# Patient Record
Sex: MALE | Race: BLACK OR AFRICAN AMERICAN | NOT HISPANIC OR LATINO | Employment: FULL TIME | URBAN - METROPOLITAN AREA
[De-identification: names, ages, dates, MRNs, and addresses within clinical notes are randomized per-mention and may not be internally consistent; named-entity substitution may affect disease eponyms.]

---

## 2023-09-20 ENCOUNTER — HOSPITAL ENCOUNTER (OUTPATIENT)
Dept: DATA CONVERSION | Facility: HOSPITAL | Age: 53
End: 2023-09-20

## 2023-09-20 DIAGNOSIS — M17.11 UNILATERAL PRIMARY OSTEOARTHRITIS, RIGHT KNEE: ICD-10-CM

## 2023-10-10 ENCOUNTER — APPOINTMENT (OUTPATIENT)
Dept: PREADMISSION TESTING | Facility: HOSPITAL | Age: 53
End: 2023-10-10
Payer: COMMERCIAL

## 2023-10-16 ENCOUNTER — PRE-ADMISSION TESTING (OUTPATIENT)
Dept: PREADMISSION TESTING | Facility: HOSPITAL | Age: 53
End: 2023-10-16
Payer: COMMERCIAL

## 2023-10-16 ENCOUNTER — ANESTHESIA EVENT (OUTPATIENT)
Dept: OPERATING ROOM | Facility: HOSPITAL | Age: 53
End: 2023-10-16
Payer: COMMERCIAL

## 2023-10-16 ENCOUNTER — LAB (OUTPATIENT)
Dept: LAB | Facility: LAB | Age: 53
End: 2023-10-16
Payer: COMMERCIAL

## 2023-10-16 VITALS
TEMPERATURE: 97.1 F | OXYGEN SATURATION: 99 % | WEIGHT: 223.77 LBS | BODY MASS INDEX: 28.72 KG/M2 | DIASTOLIC BLOOD PRESSURE: 78 MMHG | HEIGHT: 74 IN | HEART RATE: 63 BPM | RESPIRATION RATE: 18 BRPM | SYSTOLIC BLOOD PRESSURE: 132 MMHG

## 2023-10-16 DIAGNOSIS — Z01.818 PREOPERATIVE TESTING: Primary | ICD-10-CM

## 2023-10-16 DIAGNOSIS — Z01.818 PREOPERATIVE TESTING: ICD-10-CM

## 2023-10-16 LAB
ALBUMIN SERPL BCP-MCNC: 4.5 G/DL (ref 3.4–5)
ALP SERPL-CCNC: 54 U/L (ref 33–120)
ALT SERPL W P-5'-P-CCNC: 12 U/L (ref 10–52)
ANION GAP SERPL CALC-SCNC: 10 MMOL/L (ref 10–20)
APPEARANCE UR: CLEAR
AST SERPL W P-5'-P-CCNC: 14 U/L (ref 9–39)
BILIRUB SERPL-MCNC: 0.7 MG/DL (ref 0–1.2)
BILIRUB UR STRIP.AUTO-MCNC: NEGATIVE MG/DL
BUN SERPL-MCNC: 19 MG/DL (ref 6–23)
CALCIUM SERPL-MCNC: 9.5 MG/DL (ref 8.6–10.3)
CHLORIDE SERPL-SCNC: 102 MMOL/L (ref 98–107)
CO2 SERPL-SCNC: 27 MMOL/L (ref 21–32)
COLOR UR: YELLOW
CREAT SERPL-MCNC: 1.17 MG/DL (ref 0.5–1.3)
ERYTHROCYTE [DISTWIDTH] IN BLOOD BY AUTOMATED COUNT: 12 % (ref 11.5–14.5)
GFR SERPL CREATININE-BSD FRML MDRD: 74 ML/MIN/1.73M*2
GLUCOSE SERPL-MCNC: 93 MG/DL (ref 74–99)
GLUCOSE UR STRIP.AUTO-MCNC: NEGATIVE MG/DL
HCT VFR BLD AUTO: 38.4 % (ref 41–52)
HGB BLD-MCNC: 12.6 G/DL (ref 13.5–17.5)
HOLD SPECIMEN: NORMAL
KETONES UR STRIP.AUTO-MCNC: NEGATIVE MG/DL
LEUKOCYTE ESTERASE UR QL STRIP.AUTO: NEGATIVE
MCH RBC QN AUTO: 31.5 PG (ref 26–34)
MCHC RBC AUTO-ENTMCNC: 32.8 G/DL (ref 32–36)
MCV RBC AUTO: 96 FL (ref 80–100)
NITRITE UR QL STRIP.AUTO: NEGATIVE
NRBC BLD-RTO: ABNORMAL /100{WBCS}
PH UR STRIP.AUTO: 5.5 [PH]
PLATELET # BLD AUTO: 191 X10*3/UL (ref 150–450)
PMV BLD AUTO: 9.9 FL (ref 7.5–11.5)
POTASSIUM SERPL-SCNC: 4.2 MMOL/L (ref 3.5–5.3)
PROT SERPL-MCNC: 7.3 G/DL (ref 6.4–8.2)
PROT UR STRIP.AUTO-MCNC: NEGATIVE MG/DL
RBC # BLD AUTO: 4 X10*6/UL (ref 4.5–5.9)
RBC # UR STRIP.AUTO: NEGATIVE /UL
SODIUM SERPL-SCNC: 135 MMOL/L (ref 136–145)
SP GR UR STRIP.AUTO: <=1.005
UROBILINOGEN UR STRIP.AUTO-MCNC: 0.2 MG/DL
WBC # BLD AUTO: 3.9 X10*3/UL (ref 4.4–11.3)

## 2023-10-16 PROCEDURE — 80053 COMPREHEN METABOLIC PANEL: CPT

## 2023-10-16 PROCEDURE — 36415 COLL VENOUS BLD VENIPUNCTURE: CPT

## 2023-10-16 PROCEDURE — 85027 COMPLETE CBC AUTOMATED: CPT

## 2023-10-16 PROCEDURE — 81003 URINALYSIS AUTO W/O SCOPE: CPT

## 2023-10-16 ASSESSMENT — PAIN - FUNCTIONAL ASSESSMENT: PAIN_FUNCTIONAL_ASSESSMENT: 0-10

## 2023-10-16 ASSESSMENT — PAIN SCALES - GENERAL: PAINLEVEL_OUTOF10: 0 - NO PAIN

## 2023-10-16 NOTE — PREPROCEDURE INSTRUCTIONS
Medication List      as of October 16, 2023  9:09 AM     You have not been prescribed any medications.                         NPO Instructions:    Do not eat any food after midnight the night before your surgery/procedure.    Additional Instructions:     Seven/Six Days before Surgery:  Review your medication instructions, stop indicated medications  Five Days before Surgery:  Review your medication instructions, stop indicated medications  Three Days before Surgery:  Review your medication instructions, stop indicated medications  The Day before Surgery:  Review your medication instructions, stop indicated medications  You will be contacted regarding the time of your arrival to facility and surgery time  Do not eat any food after Midnight  Day of Surgery:  Review your medication instructions, take indicated medications  Wear  comfortable loose fitting clothing  Do not use moisturizers, creams, lotions or perfume  All jewelry and valuables should be left at home

## 2023-10-17 ENCOUNTER — HOSPITAL ENCOUNTER (OUTPATIENT)
Facility: HOSPITAL | Age: 53
Setting detail: OUTPATIENT SURGERY
Discharge: HOME | End: 2023-10-17
Attending: STUDENT IN AN ORGANIZED HEALTH CARE EDUCATION/TRAINING PROGRAM | Admitting: STUDENT IN AN ORGANIZED HEALTH CARE EDUCATION/TRAINING PROGRAM
Payer: COMMERCIAL

## 2023-10-17 ENCOUNTER — ANESTHESIA (OUTPATIENT)
Dept: OPERATING ROOM | Facility: HOSPITAL | Age: 53
End: 2023-10-17
Payer: COMMERCIAL

## 2023-10-17 VITALS
BODY MASS INDEX: 28.41 KG/M2 | RESPIRATION RATE: 20 BRPM | HEIGHT: 74 IN | WEIGHT: 221.34 LBS | DIASTOLIC BLOOD PRESSURE: 90 MMHG | HEART RATE: 56 BPM | TEMPERATURE: 96.8 F | OXYGEN SATURATION: 100 % | SYSTOLIC BLOOD PRESSURE: 123 MMHG

## 2023-10-17 DIAGNOSIS — R97.20 ELEVATED PSA: ICD-10-CM

## 2023-10-17 PROCEDURE — 2580000001 HC RX 258 IV SOLUTIONS: Performed by: ANESTHESIOLOGY

## 2023-10-17 PROCEDURE — 2720000007 HC OR 272 NO HCPCS: Performed by: STUDENT IN AN ORGANIZED HEALTH CARE EDUCATION/TRAINING PROGRAM

## 2023-10-17 PROCEDURE — 2500000005 HC RX 250 GENERAL PHARMACY W/O HCPCS

## 2023-10-17 PROCEDURE — 7100000001 HC RECOVERY ROOM TIME - INITIAL BASE CHARGE: Performed by: STUDENT IN AN ORGANIZED HEALTH CARE EDUCATION/TRAINING PROGRAM

## 2023-10-17 PROCEDURE — 3700000002 HC GENERAL ANESTHESIA TIME - EACH INCREMENTAL 1 MINUTE: Performed by: STUDENT IN AN ORGANIZED HEALTH CARE EDUCATION/TRAINING PROGRAM

## 2023-10-17 PROCEDURE — 88344 IMHCHEM/IMCYTCHM EA MLT ANTB: CPT | Performed by: STUDENT IN AN ORGANIZED HEALTH CARE EDUCATION/TRAINING PROGRAM

## 2023-10-17 PROCEDURE — 3600000007 HC OR TIME - EACH INCREMENTAL 1 MINUTE - PROCEDURE LEVEL TWO: Performed by: STUDENT IN AN ORGANIZED HEALTH CARE EDUCATION/TRAINING PROGRAM

## 2023-10-17 PROCEDURE — 2500000004 HC RX 250 GENERAL PHARMACY W/ HCPCS (ALT 636 FOR OP/ED): Performed by: STUDENT IN AN ORGANIZED HEALTH CARE EDUCATION/TRAINING PROGRAM

## 2023-10-17 PROCEDURE — 88305 TISSUE EXAM BY PATHOLOGIST: CPT | Mod: TC,SUR | Performed by: STUDENT IN AN ORGANIZED HEALTH CARE EDUCATION/TRAINING PROGRAM

## 2023-10-17 PROCEDURE — 55700 PR PROSTATE NEEDLE BIOPSY ANY APPROACH: CPT | Performed by: STUDENT IN AN ORGANIZED HEALTH CARE EDUCATION/TRAINING PROGRAM

## 2023-10-17 PROCEDURE — 2500000004 HC RX 250 GENERAL PHARMACY W/ HCPCS (ALT 636 FOR OP/ED): Performed by: NURSE ANESTHETIST, CERTIFIED REGISTERED

## 2023-10-17 PROCEDURE — A55700 PR BIOPSY OF PROSTATE,NEEDLE/PUNCH: Performed by: NURSE ANESTHETIST, CERTIFIED REGISTERED

## 2023-10-17 PROCEDURE — A55700 PR BIOPSY OF PROSTATE,NEEDLE/PUNCH: Performed by: ANESTHESIOLOGY

## 2023-10-17 PROCEDURE — 7100000010 HC PHASE TWO TIME - EACH INCREMENTAL 1 MINUTE: Performed by: STUDENT IN AN ORGANIZED HEALTH CARE EDUCATION/TRAINING PROGRAM

## 2023-10-17 PROCEDURE — 7100000002 HC RECOVERY ROOM TIME - EACH INCREMENTAL 1 MINUTE: Performed by: STUDENT IN AN ORGANIZED HEALTH CARE EDUCATION/TRAINING PROGRAM

## 2023-10-17 PROCEDURE — G0416 PROSTATE BIOPSY, ANY MTHD: HCPCS | Performed by: STUDENT IN AN ORGANIZED HEALTH CARE EDUCATION/TRAINING PROGRAM

## 2023-10-17 PROCEDURE — 88305 TISSUE EXAM BY PATHOLOGIST: CPT | Mod: TC,59 | Performed by: STUDENT IN AN ORGANIZED HEALTH CARE EDUCATION/TRAINING PROGRAM

## 2023-10-17 PROCEDURE — 7100000009 HC PHASE TWO TIME - INITIAL BASE CHARGE: Performed by: STUDENT IN AN ORGANIZED HEALTH CARE EDUCATION/TRAINING PROGRAM

## 2023-10-17 PROCEDURE — 3600000002 HC OR TIME - INITIAL BASE CHARGE - PROCEDURE LEVEL TWO: Performed by: STUDENT IN AN ORGANIZED HEALTH CARE EDUCATION/TRAINING PROGRAM

## 2023-10-17 PROCEDURE — 76872 US TRANSRECTAL: CPT | Performed by: STUDENT IN AN ORGANIZED HEALTH CARE EDUCATION/TRAINING PROGRAM

## 2023-10-17 PROCEDURE — 2500000004 HC RX 250 GENERAL PHARMACY W/ HCPCS (ALT 636 FOR OP/ED): Performed by: ANESTHESIOLOGY

## 2023-10-17 PROCEDURE — 3700000001 HC GENERAL ANESTHESIA TIME - INITIAL BASE CHARGE: Performed by: STUDENT IN AN ORGANIZED HEALTH CARE EDUCATION/TRAINING PROGRAM

## 2023-10-17 RX ORDER — LABETALOL HYDROCHLORIDE 5 MG/ML
5 INJECTION, SOLUTION INTRAVENOUS ONCE AS NEEDED
Status: DISCONTINUED | OUTPATIENT
Start: 2023-10-17 | End: 2023-10-17 | Stop reason: HOSPADM

## 2023-10-17 RX ORDER — KETOROLAC TROMETHAMINE 30 MG/ML
INJECTION, SOLUTION INTRAMUSCULAR; INTRAVENOUS AS NEEDED
Status: DISCONTINUED | OUTPATIENT
Start: 2023-10-17 | End: 2023-10-17

## 2023-10-17 RX ORDER — PROPOFOL 10 MG/ML
INJECTION, EMULSION INTRAVENOUS AS NEEDED
Status: DISCONTINUED | OUTPATIENT
Start: 2023-10-17 | End: 2023-10-17

## 2023-10-17 RX ORDER — ONDANSETRON HYDROCHLORIDE 2 MG/ML
4 INJECTION, SOLUTION INTRAVENOUS ONCE AS NEEDED
Status: COMPLETED | OUTPATIENT
Start: 2023-10-17 | End: 2023-10-17

## 2023-10-17 RX ORDER — SODIUM CHLORIDE, SODIUM LACTATE, POTASSIUM CHLORIDE, CALCIUM CHLORIDE 600; 310; 30; 20 MG/100ML; MG/100ML; MG/100ML; MG/100ML
50 INJECTION, SOLUTION INTRAVENOUS CONTINUOUS
Status: DISCONTINUED | OUTPATIENT
Start: 2023-10-17 | End: 2023-10-17 | Stop reason: HOSPADM

## 2023-10-17 RX ORDER — FENTANYL CITRATE 50 UG/ML
INJECTION, SOLUTION INTRAMUSCULAR; INTRAVENOUS AS NEEDED
Status: DISCONTINUED | OUTPATIENT
Start: 2023-10-17 | End: 2023-10-17

## 2023-10-17 RX ORDER — MIDAZOLAM HYDROCHLORIDE 1 MG/ML
INJECTION, SOLUTION INTRAMUSCULAR; INTRAVENOUS AS NEEDED
Status: DISCONTINUED | OUTPATIENT
Start: 2023-10-17 | End: 2023-10-17

## 2023-10-17 RX ORDER — BISMUTH SUBSALICYLATE 262 MG
1 TABLET,CHEWABLE ORAL DAILY
COMMUNITY

## 2023-10-17 RX ORDER — FENTANYL CITRATE 50 UG/ML
50 INJECTION, SOLUTION INTRAMUSCULAR; INTRAVENOUS EVERY 5 MIN PRN
Status: DISCONTINUED | OUTPATIENT
Start: 2023-10-17 | End: 2023-10-17 | Stop reason: HOSPADM

## 2023-10-17 RX ORDER — CEFTRIAXONE 2 G/50ML
2 INJECTION, SOLUTION INTRAVENOUS ONCE
Status: COMPLETED | OUTPATIENT
Start: 2023-10-17 | End: 2023-10-17

## 2023-10-17 RX ORDER — BUPIVACAINE HYDROCHLORIDE 5 MG/ML
INJECTION, SOLUTION PERINEURAL AS NEEDED
Status: DISCONTINUED | OUTPATIENT
Start: 2023-10-17 | End: 2023-10-17 | Stop reason: HOSPADM

## 2023-10-17 RX ORDER — HYDRALAZINE HYDROCHLORIDE 20 MG/ML
5 INJECTION INTRAMUSCULAR; INTRAVENOUS EVERY 30 MIN PRN
Status: DISCONTINUED | OUTPATIENT
Start: 2023-10-17 | End: 2023-10-17 | Stop reason: HOSPADM

## 2023-10-17 RX ORDER — ONDANSETRON HYDROCHLORIDE 2 MG/ML
INJECTION, SOLUTION INTRAVENOUS AS NEEDED
Status: DISCONTINUED | OUTPATIENT
Start: 2023-10-17 | End: 2023-10-17

## 2023-10-17 RX ADMIN — ONDANSETRON 4 MG: 2 INJECTION INTRAMUSCULAR; INTRAVENOUS at 07:22

## 2023-10-17 RX ADMIN — HYDROMORPHONE HYDROCHLORIDE 0.2 MG: 0.2 INJECTION, SOLUTION INTRAMUSCULAR; INTRAVENOUS; SUBCUTANEOUS at 07:41

## 2023-10-17 RX ADMIN — KETOROLAC TROMETHAMINE 30 MG: 30 INJECTION, SOLUTION INTRAMUSCULAR; INTRAVENOUS at 07:22

## 2023-10-17 RX ADMIN — SODIUM CHLORIDE, POTASSIUM CHLORIDE, SODIUM LACTATE AND CALCIUM CHLORIDE: 600; 310; 30; 20 INJECTION, SOLUTION INTRAVENOUS at 07:03

## 2023-10-17 RX ADMIN — PROPOFOL 50 MG: 10 INJECTION, EMULSION INTRAVENOUS at 07:04

## 2023-10-17 RX ADMIN — MIDAZOLAM 2 MG: 1 INJECTION INTRAMUSCULAR; INTRAVENOUS at 07:03

## 2023-10-17 RX ADMIN — ONDANSETRON 4 MG: 2 INJECTION INTRAMUSCULAR; INTRAVENOUS at 07:40

## 2023-10-17 RX ADMIN — FENTANYL CITRATE 100 MCG: 50 INJECTION INTRAMUSCULAR; INTRAVENOUS at 07:03

## 2023-10-17 RX ADMIN — PROPOFOL 50 MG: 10 INJECTION, EMULSION INTRAVENOUS at 07:15

## 2023-10-17 RX ADMIN — PROPOFOL 50 MG: 10 INJECTION, EMULSION INTRAVENOUS at 07:06

## 2023-10-17 RX ADMIN — PROPOFOL 50 MG: 10 INJECTION, EMULSION INTRAVENOUS at 07:10

## 2023-10-17 RX ADMIN — CEFTRIAXONE SODIUM 2 G: 2 INJECTION, SOLUTION INTRAVENOUS at 07:04

## 2023-10-17 SDOH — HEALTH STABILITY: MENTAL HEALTH: CURRENT SMOKER: 0

## 2023-10-17 ASSESSMENT — PAIN - FUNCTIONAL ASSESSMENT
PAIN_FUNCTIONAL_ASSESSMENT: 0-10

## 2023-10-17 ASSESSMENT — PAIN SCALES - GENERAL
PAINLEVEL_OUTOF10: 2
PAINLEVEL_OUTOF10: 0 - NO PAIN
PAINLEVEL_OUTOF10: 3
PAINLEVEL_OUTOF10: 2
PAINLEVEL_OUTOF10: 5 - MODERATE PAIN
PAINLEVEL_OUTOF10: 2
PAINLEVEL_OUTOF10: 4

## 2023-10-17 ASSESSMENT — COLUMBIA-SUICIDE SEVERITY RATING SCALE - C-SSRS
2. HAVE YOU ACTUALLY HAD ANY THOUGHTS OF KILLING YOURSELF?: NO
1. IN THE PAST MONTH, HAVE YOU WISHED YOU WERE DEAD OR WISHED YOU COULD GO TO SLEEP AND NOT WAKE UP?: NO
6. HAVE YOU EVER DONE ANYTHING, STARTED TO DO ANYTHING, OR PREPARED TO DO ANYTHING TO END YOUR LIFE?: NO

## 2023-10-17 NOTE — PERIOPERATIVE NURSING NOTE
Patient in Phase 2; dressed and up to chair with RN assist. Tolerating po fluids, no complaint of pain and no complaint of nausea.     Family at bedside; discussed discharge instructions with patient and Family. All questions at this time answered.     Patient clinically appropriate for discharge. IV removed and patient transported to discharge area via wheelchair.

## 2023-10-17 NOTE — ANESTHESIA PREPROCEDURE EVALUATION
Patient: Judson Smith    Procedure Information       Date/Time: 10/17/23 0700    Procedure: TRANSPERINEAL FUSION PROSTATE BIOPSY (URONAV/PERCISION POINT)    Location: [facility] / [facility]    Surgeons: Andrea Brooke MD          Past Medical History:   Diagnosis Date    Vision loss       Past Surgical History:   Procedure Laterality Date    NOSE SURGERY      AGE 11        Relevant Problems   No relevant active problems       Clinical information reviewed:                   NPO Detail:  No data recorded     Physical Exam    Airway  Mallampati: II  TM distance: >3 FB  Neck ROM: full     Cardiovascular   Comments: deferred   Dental    Pulmonary   Comments: deferred   Abdominal     Comments: deferred           Anesthesia Plan    ASA 1     general and MAC     The patient is not a current smoker.  Patient did not smoke on day of procedure.  Education provided regarding risk of obstructive sleep apnea.  intravenous induction   Postoperative administration of opioids is intended.  Anesthetic plan and risks discussed with patient.    Plan discussed with CRNA.

## 2023-10-17 NOTE — H&P
"History Of Present Illness  Joe Smith Jr. is a 53 y.o. male presenting with prostate cancer .     Past Medical History  Past Medical History:   Diagnosis Date    Vision loss        Surgical History  Past Surgical History:   Procedure Laterality Date    NOSE SURGERY      AGE 11        Social History  He reports that he has been smoking cigars. He has never used smokeless tobacco. He reports current alcohol use of about 5.0 standard drinks of alcohol per week. He reports that he does not use drugs.    Family History  Family History   Problem Relation Name Age of Onset    COPD Mother SOY     Other (VASOVAGAL) Mother SOY     Atrial fibrillation Mother SOY     Cancer Father JOE LEAL         Allergies  Patient has no known allergies.    Review of Systems   All other systems reviewed and are negative.       Physical Exam  Constitutional:       Appearance: Normal appearance.   HENT:      Head: Normocephalic and atraumatic.   Eyes:      Pupils: Pupils are equal, round, and reactive to light.   Pulmonary:      Effort: Pulmonary effort is normal.   Abdominal:      Palpations: Abdomen is soft.   Musculoskeletal:         General: Normal range of motion.   Skin:     General: Skin is warm.   Neurological:      Mental Status: He is alert.   Psychiatric:         Mood and Affect: Mood normal.          Last Recorded Vitals  Blood pressure 139/82, pulse 67, temperature 36.3 °C (97.3 °F), temperature source Temporal, resp. rate 20, height 1.88 m (6' 2.02\"), weight 100 kg (221 lb 5.5 oz), SpO2 100 %.    Relevant Results             Assessment/Plan   Active Problems:  There are no active Hospital Problems.      biopsy       I spent  minutes in the professional and overall care of this patient.      Andrea Brooke MD    "

## 2023-10-17 NOTE — ANESTHESIA POSTPROCEDURE EVALUATION
Patient: Judson Smith Jr.    Procedure Summary       Date: 10/17/23 Room / Location: [facility] / [facility]    Anesthesia Start: 0703 Anesthesia Stop: 0731    Procedure: TRANSPERINEAL FUSION PROSTATE BIOPSY (URONAV/PERCISION POINT) Diagnosis:     Surgeons: Andrea Brooke MD Responsible Provider: Karlos Terrell MD    Anesthesia Type: general, MAC ASA Status: 1            Anesthesia Type: general, MAC    Vitals Value Taken Time   /84 10/17/23 0755   Temp 36.1 °C (97 °F) 10/17/23 0726   Pulse 60 10/17/23 0755   Resp 16 10/17/23 0755   SpO2 100 % 10/17/23 0755       Anesthesia Post Evaluation    Patient location during evaluation: PACU  Patient participation: complete - patient participated  Level of consciousness: awake and alert  Pain management: adequate  Multimodal analgesia pain management approach  Airway patency: patent  Two or more strategies used to mitigate risk of obstructive sleep apnea  Cardiovascular status: acceptable and blood pressure returned to baseline  Respiratory status: acceptable  Hydration status: acceptable        No notable events documented.

## 2023-10-17 NOTE — OP NOTE
TRANSPERINEAL FUSION PROSTATE BIOPSY (URONAV/PERCISION POINT) Operative Note     Date: 10/17/2023  OR Location: [facility]    Name: Judson Smith Jr., : 1970, Age: 53 y.o., MRN: [id number], Sex: male    Diagnosis  * No Diagnosis Codes entered * * No Diagnosis Codes entered *     Procedures    * TRANSPERINEAL FUSION PROSTATE BIOPSY (URONAV/PERCISION POINT)    Surgeons      * Andrea Brooke - Primary    Resident/Fellow/Other Assistant:  No surgical staff documented.        Estimated Blood Loss (ml)  5.   Specimen(s) Removed  Removed systematic biopsies.   Drain(s)  None.   Implant(s)  None.   Complications  None.   Indications (History)  Elevated PSA.   Findings of Procedure  35g prostate,    Description of Procedure  After administration of anesthesia, a prostate block was performed and transrectal ultrasound. Transperineal biopsies taken from systematic template performed using the precision point device.

## 2023-10-18 ASSESSMENT — PAIN SCALES - GENERAL: PAINLEVEL_OUTOF10: 3

## 2023-10-30 PROBLEM — R97.20 HIGH PROSTATE SPECIFIC ANTIGEN (PSA): Status: ACTIVE | Noted: 2022-09-07

## 2023-10-30 PROBLEM — C61 PROSTATE CANCER (MULTI): Status: ACTIVE | Noted: 2022-12-14

## 2023-10-30 PROBLEM — E55.9 VITAMIN D DEFICIENCY: Status: ACTIVE | Noted: 2023-07-28

## 2023-10-30 PROBLEM — H15.101 EPISCLERITIS OF RIGHT EYE: Status: ACTIVE | Noted: 2021-01-05

## 2023-10-30 PROBLEM — A64 VENEREAL DISEASE: Status: ACTIVE | Noted: 2023-10-30

## 2023-10-30 PROBLEM — R31.29 MICROSCOPIC HEMATURIA: Status: ACTIVE | Noted: 2022-09-07

## 2023-10-30 PROBLEM — M54.9 BACKACHE: Status: ACTIVE | Noted: 2023-10-30

## 2023-10-30 PROBLEM — J06.9 VIRAL UPPER RESPIRATORY INFECTION: Status: ACTIVE | Noted: 2023-10-30

## 2023-10-30 RX ORDER — CHOLECALCIFEROL (VITAMIN D3) 25 MCG
1 TABLET ORAL DAILY
COMMUNITY
Start: 2023-01-19

## 2023-10-30 RX ORDER — POLYETHYLENE GLYCOL 3350, SODIUM SULFATE ANHYDROUS, SODIUM BICARBONATE, SODIUM CHLORIDE, POTASSIUM CHLORIDE 236; 22.74; 6.74; 5.86; 2.97 G/4L; G/4L; G/4L; G/4L; G/4L
POWDER, FOR SOLUTION ORAL
COMMUNITY
Start: 2021-01-27

## 2023-11-13 LAB
LAB AP ASR DISCLAIMER: NORMAL
LABORATORY COMMENT REPORT: NORMAL
PATH REPORT.FINAL DX SPEC: NORMAL
PATH REPORT.GROSS SPEC: NORMAL
PATH REPORT.RELEVANT HX SPEC: NORMAL
PATH REPORT.TOTAL CANCER: NORMAL

## 2023-11-13 PROCEDURE — 88344 IMHCHEM/IMCYTCHM EA MLT ANTB: CPT | Mod: TC,SUR,BEALAB | Performed by: STUDENT IN AN ORGANIZED HEALTH CARE EDUCATION/TRAINING PROGRAM

## 2023-11-16 ENCOUNTER — APPOINTMENT (OUTPATIENT)
Dept: UROLOGY | Facility: CLINIC | Age: 53
End: 2023-11-16
Payer: COMMERCIAL

## 2023-11-29 ENCOUNTER — OFFICE VISIT (OUTPATIENT)
Dept: UROLOGY | Facility: CLINIC | Age: 53
End: 2023-11-29
Payer: COMMERCIAL

## 2023-11-29 VITALS
WEIGHT: 225.2 LBS | HEART RATE: 64 BPM | SYSTOLIC BLOOD PRESSURE: 120 MMHG | BODY MASS INDEX: 28.9 KG/M2 | TEMPERATURE: 97.8 F | DIASTOLIC BLOOD PRESSURE: 79 MMHG | HEIGHT: 74 IN

## 2023-11-29 DIAGNOSIS — C61 PROSTATE CANCER (MULTI): Primary | ICD-10-CM

## 2023-11-29 PROCEDURE — 99214 OFFICE O/P EST MOD 30 MIN: CPT | Performed by: STUDENT IN AN ORGANIZED HEALTH CARE EDUCATION/TRAINING PROGRAM

## 2023-11-29 ASSESSMENT — PAIN SCALES - GENERAL: PAINLEVEL: 0-NO PAIN

## 2023-11-29 NOTE — PROGRESS NOTES
HPI:  Proc (10/17/23): TP prostate biopsy   Path: prostatic adenocarcinoma, GG3 (Radha score 4+3=7), 60% of tissue, pattern 4 (30%), GG2 (Radha score 3+4=7), GG1 ([place] score 3+3=6)      53 year old male referred by Dr. Hammer for prostate cancer, dx GG2 ([place] 3+4=7). Hx of elevated PSA. MRI Prostate (5/8/23) showed BPH changes of the TZ, diffuse non nodular hypointensities within the PZ, without evidence of focally restricted diffusion (PI-RADS 2). Erections sufficient for intercourse. S/p TP prostate biopsy (10/17/23) with pathology showing prostatic adenocarcinoma, GG3 ([place] score 4+3=7), 60% of tissue, pattern 4 (30%), GG2 ([place] score 3+4=7), GG1 (Radha score 3+3=6). Good urinary and sexual function.      Non-smoker   FHx: father had prostate cancer, treated with chemotherapy      PSA: 5.97 (1/17/23)     MRI Prostate (5/8/23): 38g  BPH changes of the TZ, diffuse non nodular hypointensities within the PZ, without evidence of focally restricted diffusion (PI-RADS 2).     Review of Systems:  All systems reviewed. Anything negative noted in the HPI.    Physical Exam:  Virtual visit.     Assessment/Plan   53 year old male referred by Dr. Hammer for prostate cancer, dx GG2 (Schriever 3+4=7). Hx of elevated PSA. MRI Prostate (5/8/23) showed BPH changes of the TZ, diffuse non nodular hypointensities within the PZ, without evidence of focally restricted diffusion (PI-RADS 2). Erections sufficient for intercourse. S/p TP prostate biopsy (10/17/23) with pathology showing prostatic adenocarcinoma, GG3 ([place] score 4+3=7), 60% of tissue, pattern 4 (30%), GG2 ([place] score 3+4=7), GG1 ([place]ri[place] score 3+3=6). Good urinary and sexual function. Management options including risks, benefits and alternatives discussed at length and all questions answered. Patient prefers to proceed referral to radiation oncology for additional treatment opinion and Rena Sanches  and follow-up in 1mo.    Patient given surgery  packet today.       By signing my name below, I, Gildardo Saenz, attest that this documentation has been prepared under the direction and in the presence of Dr. Andrea Brooke.  All medical record entries made by the Scribe were at my direction and personally dictated by me. I have reviewed the chart and agree that the record accurately reflects my personal performance of the history, physical exam, discussion and plan.

## 2023-11-29 NOTE — LETTER
November 29, 2023     Samantha Hammer MD  [address] [facility] [address]  [facility] [address]    Patient: Judson Smith Jr.   YOB: 1970   Date of Visit: 11/29/2023       Dear Dr. Samantha Hammer MD:    Thank you for referring Judson Smith to me for evaluation. Below are my notes for this consultation.  If you have questions, please do not hesitate to call me. I look forward to following your patient along with you.       Sincerely,     Andrea Brooke MD      CC: No Recipients  ______________________________________________________________________________________    HPI:  Proc (10/17/23): TP prostate biopsy   Path: prostatic adenocarcinoma, GG3 (Radha score 4+3=7), 60% of tissue, pattern 4 (30%), GG2 ([place] score 3+4=7), GG1 ([place] score 3+3=6)      53 year old male referred by Dr. Hammer for prostate cancer, dx GG2 (Radha 3+4=7). Hx of elevated PSA. MRI Prostate (5/8/23) showed BPH changes of the TZ, diffuse non nodular hypointensities within the PZ, without evidence of focally restricted diffusion (PI-RADS 2). Erections sufficient for intercourse. S/p TP prostate biopsy (10/17/23) with pathology showing prostatic adenocarcinoma, GG3 (Radha score 4+3=7), 60% of tissue, pattern 4 (30%), GG2 ([place] score 3+4=7), GG1 (Radha score 3+3=6). Good urinary and sexual function.      Non-smoker   FHx: father had prostate cancer, treated with chemotherapy      PSA: 5.97 (1/17/23)     MRI Prostate (5/8/23): 38g  BPH changes of the TZ, diffuse non nodular hypointensities within the PZ, without evidence of focally restricted diffusion (PI-RADS 2).     Review of Systems:  All systems reviewed. Anything negative noted in the HPI.    Physical Exam:  Virtual visit.     Assessment/Plan  53 year old male referred by Dr. Hammer for prostate cancer, dx GG2 ([place] 3+4=7). Hx of elevated PSA. MRI Prostate (5/8/23) showed BPH changes of the TZ, diffuse non nodular hypointensities within the PZ, without  evidence of focally restricted diffusion (PI-RADS 2). Erections sufficient for intercourse. S/p TP prostate biopsy (10/17/23) with pathology showing prostatic adenocarcinoma, GG3 ([place] score 4+3=7), 60% of tissue, pattern 4 (30%), GG2 (Radha score 3+4=7), GG1 ([place] score 3+3=6). Good urinary and sexual function. Management options including risks, benefits and alternatives discussed at length and all questions answered. Patient prefers to proceed referral to radiation oncology for additional treatment opinion and Rena Sanches  and follow-up in 1mo.    Patient given surgery packet today.       By signing my name below, I, Gildardo Saenz, attest that this documentation has been prepared under the direction and in the presence of Dr. Andrea Brooke.  All medical record entries made by the Scribe were at my direction and personally dictated by me. I have reviewed the chart and agree that the record accurately reflects my personal performance of the history, physical exam, discussion and plan.

## 2023-11-30 ENCOUNTER — OFFICE VISIT (OUTPATIENT)
Dept: UROLOGY | Facility: CLINIC | Age: 53
End: 2023-11-30
Payer: COMMERCIAL

## 2023-11-30 VITALS — WEIGHT: 225 LBS | TEMPERATURE: 95.9 F | HEIGHT: 74 IN | BODY MASS INDEX: 28.88 KG/M2

## 2023-11-30 DIAGNOSIS — C61 PROSTATE CANCER (MULTI): Primary | ICD-10-CM

## 2023-11-30 PROCEDURE — 99214 OFFICE O/P EST MOD 30 MIN: CPT | Performed by: NURSE PRACTITIONER

## 2023-11-30 ASSESSMENT — PATIENT HEALTH QUESTIONNAIRE - PHQ9
SUM OF ALL RESPONSES TO PHQ9 QUESTIONS 1 AND 2: 0
2. FEELING DOWN, DEPRESSED OR HOPELESS: NOT AT ALL
1. LITTLE INTEREST OR PLEASURE IN DOING THINGS: NOT AT ALL

## 2023-11-30 ASSESSMENT — ENCOUNTER SYMPTOMS
LOSS OF SENSATION IN FEET: 0
OCCASIONAL FEELINGS OF UNSTEADINESS: 0
DEPRESSION: 0

## 2023-11-30 ASSESSMENT — PAIN SCALES - GENERAL: PAINLEVEL: 0-NO PAIN

## 2023-11-30 ASSESSMENT — COLUMBIA-SUICIDE SEVERITY RATING SCALE - C-SSRS
2. HAVE YOU ACTUALLY HAD ANY THOUGHTS OF KILLING YOURSELF?: NO
6. HAVE YOU EVER DONE ANYTHING, STARTED TO DO ANYTHING, OR PREPARED TO DO ANYTHING TO END YOUR LIFE?: NO
1. IN THE PAST MONTH, HAVE YOU WISHED YOU WERE DEAD OR WISHED YOU COULD GO TO SLEEP AND NOT WAKE UP?: NO

## 2023-11-30 NOTE — PROGRESS NOTES
[facility]  Outpatient Clinic Note      Patient: Judson Smith Jr.  Age/Sex: 53 y.o., male  MRN: [id number]      History of Present Illness  53-year-old gentleman presents to discuss prostate cancer treatment options, kindly referred by Dr. Brooke. He has a history of newly diagnosed GG2 ([place] 3+4=7). Hx of elevated PSA. MRI Prostate (5/8/23) showed BPH changes of the TZ, diffuse non nodular hypointensities within the PZ, without evidence of focally restricted diffusion (PI-RADS 2). Erections sufficient for intercourse. S/p TP prostate biopsy (10/17/23) with pathology showing prostatic adenocarcinoma, GG3 ([place] score 4+3=7), 60% of tissue, pattern 4 (30%), GG2 (Radha score 3+4=7), GG1 (Radha score 3+3=6). Good urinary and sexual function.      Past Medical & Surgical History  Past Medical History:   Diagnosis Date    Vision loss       Past Surgical History:   Procedure Laterality Date    NOSE SURGERY      AGE 11          Labs  PSA/PROSTSPECAG SCRN  Order: [id number]  Component  Ref Range & Units 4 mo ago   PSA Screening  <2.60 ng/mL 6.71 High        Medications:  Current Outpatient Medications on File Prior to Visit   Medication Sig Dispense Refill    cholecalciferol (Vitamin D-3) 25 MCG (1000 UT) tablet Take 1 tablet (25 mcg) by mouth once daily.      multivitamin tablet Take 1 tablet by mouth once daily.      polyethylene glycol-electrolytes (polyethylene glycol) 420 gram solution Take by mouth.       No current facility-administered medications on file prior to visit.          Physical Exam                                                                                                                      General: Well developed, well nourished, alert and cooperative, appears in no acute distress  Eyes: Non-injected conjunctiva, sclera clear, no proptosis  Cardiac: Extremities are warm and well perfused. No edema, cyanosis or pallor.   Lungs: Breathing is easy, non-labored. Speaking in clear  and complete sentences. Normal diaphragmatic movement.  MSK: Ambulatory with steady gait, unassisted  Neuro: alert and oriented to person, place and time  Psych: Demonstrates good judgement and reason, without hallucinations, abnormal affect or abnormal behaviors.  Skin: no obvious lesions, no rashes      Review of Systems  Review of Systems   All other systems reviewed and are negative.         Imaging  MRI Prostate 5/8/2023  IMPRESSION:  BPH changes of the transition zone. Diffuse non nodular  hypointensities within the peripheral zone, without evidence of  focally restricted diffusion ( PI-RADS 2).    Assessment & Plan  53-year-old gentleman presents to discuss prostate cancer treatment options, kindly referred by Dr. Brooke. He has a history of newly diagnosed GG2 (Radha 3+4=7). Hx of elevated PSA. MRI Prostate (5/8/23) showed BPH changes of the TZ, diffuse non nodular hypointensities within the PZ, without evidence of focally restricted diffusion (PI-RADS 2). Erections sufficient for intercourse. S/p TP prostate biopsy (10/17/23) with pathology showing prostatic adenocarcinoma, GG3 (Radha score 4+3=7), 60% of tissue, pattern 4 (30%), GG2 ([place] score 3+4=7), GG1 ([place] score 3+3=6). Good urinary and sexual function.      I had a long discussion with him regarding the different treatment options. We discussed all the different options in detail including the different radiation therapy options of; IMRT and brachytherapy. We also discussed the surgical options including robotic radical prostatectomy with bilateral pelvic lymph node dissection. We reviewed the role of active surveillance in detail. I discussed all the different risks, benefits, and alternatives of each one of these in detail, and I answered their questions to their satisfaction. I also gave him some literature review.     I have offered an appointment for him to meet with one of our Radiation Oncologists to discuss the radiation options in  more detail and I will be available at any time to answer any further questions. I have also scheduled appointment to meet with one of our surgeons to discuss further surgical intervention. I will see him back after he has had a chance to review these options in more detail to help answer any questions along the way. We will wait for him to decide how he would like to be treated.        Reviewed and approved by LIZ MEYERS on 11/30/23 at 9:20 AM.

## 2023-12-05 ENCOUNTER — APPOINTMENT (OUTPATIENT)
Dept: RADIATION ONCOLOGY | Facility: HOSPITAL | Age: 53
End: 2023-12-05
Payer: COMMERCIAL

## 2023-12-11 ENCOUNTER — TELEPHONE (OUTPATIENT)
Dept: RADIATION ONCOLOGY | Facility: HOSPITAL | Age: 53
End: 2023-12-11
Payer: COMMERCIAL

## 2023-12-11 NOTE — TELEPHONE ENCOUNTER
Called pt. to remind of appointment on 12/12/2023 at 2:30 pm with Dr. Lopez. Pt's phone went to voicemail left number if needs to reschedule.

## 2023-12-12 ENCOUNTER — APPOINTMENT (OUTPATIENT)
Dept: RADIATION ONCOLOGY | Facility: HOSPITAL | Age: 53
End: 2023-12-12
Payer: COMMERCIAL

## 2023-12-13 ENCOUNTER — TELEPHONE (OUTPATIENT)
Dept: RADIATION ONCOLOGY | Facility: HOSPITAL | Age: 53
End: 2023-12-13
Payer: COMMERCIAL

## 2023-12-13 NOTE — TELEPHONE ENCOUNTER
Called pt. to remind of appointment on 12/15/2023 at 1:30 pm with Dr. Lopez. Pt's phone went to voicemail left number if needs to reschedule.

## 2023-12-15 ENCOUNTER — HOSPITAL ENCOUNTER (OUTPATIENT)
Dept: RADIATION ONCOLOGY | Facility: HOSPITAL | Age: 53
Setting detail: RADIATION/ONCOLOGY SERIES
Discharge: HOME | End: 2023-12-15
Payer: COMMERCIAL

## 2023-12-15 VITALS
TEMPERATURE: 97.7 F | DIASTOLIC BLOOD PRESSURE: 76 MMHG | OXYGEN SATURATION: 98 % | SYSTOLIC BLOOD PRESSURE: 123 MMHG | WEIGHT: 224 LBS | BODY MASS INDEX: 28.75 KG/M2 | HEART RATE: 69 BPM | HEIGHT: 74 IN | RESPIRATION RATE: 18 BRPM

## 2023-12-15 DIAGNOSIS — C61 PROSTATE CANCER (MULTI): ICD-10-CM

## 2023-12-15 PROCEDURE — 99215 OFFICE O/P EST HI 40 MIN: CPT | Performed by: STUDENT IN AN ORGANIZED HEALTH CARE EDUCATION/TRAINING PROGRAM

## 2023-12-15 PROCEDURE — 99205 OFFICE O/P NEW HI 60 MIN: CPT | Performed by: STUDENT IN AN ORGANIZED HEALTH CARE EDUCATION/TRAINING PROGRAM

## 2023-12-15 ASSESSMENT — PATIENT HEALTH QUESTIONNAIRE - PHQ9
2. FEELING DOWN, DEPRESSED OR HOPELESS: NOT AT ALL
SUM OF ALL RESPONSES TO PHQ9 QUESTIONS 1 AND 2: 0
1. LITTLE INTEREST OR PLEASURE IN DOING THINGS: NOT AT ALL

## 2023-12-15 ASSESSMENT — PAIN SCALES - GENERAL: PAINLEVEL: 0-NO PAIN

## 2023-12-15 NOTE — PROGRESS NOTES
Staff Physician: Melissa Lopez MD PhD  Resident Physician: Guero Segovia MD  Referring Physician: Andrea Brooke MD  Date of Service: 12/15/2023  MRN: [id number]    RADIATION ONCOLOGY CONSULT NOTE    IDENTIFYING DATA:   Cancer Staging   Prostate cancer (CMS/Formerly [facility])  Staging form: Prostate, AJCC 8th Edition  - Clinical stage from 10/17/2023: Stage IIC (cT1c, cN0, cM0, PSA: 6.7, Grade Group: 3) - Signed by Melissa Lopez MD PhD on 12/15/2023    Problem List Items Addressed This Visit       Prostate cancer (CMS/Formerly [facility])    Relevant Orders    Referral to Radiation Oncology    Rad Onc Intent to Treat       Mr. Judson Smith Jr. is a 53-year-old with newly diagnosed prostate adenocarcinoma, referred by Andrea Becker MD, for evaluation and discussion of treatment recommendations.    HISTORY OF PRESENT ILLNESS:  Today, Mr. Judson Smith Jr. is self-accompanied to clinic today.    Initially presented with elevated PSA.    5/8/23 MRI prostate w/wo contrast: BPH changes of the TZ. Diffuse non nodular hypointensities within the PZ, without evidence of focally restricted diffusion (PI-RADS 2).    9/26/23 PSMA PET: showed diffuse heterogeneous increased tracer uptake of max SUV 11.8 consistent with known neoplasm. No SVI. Negative for estrella or distant metastasis.    10/17/23 TP fusion prostate biopsy:  7/18 systematic cores involved with prostatic adenocarcinoma, GG3    PSA:  7/28/23 6.71  1/17/23 5.97  8/26/22 4.47  1/12/21 4.2    Today, patient reports doing well. Denies any urinary symptoms, changes in bowel movements, or any other symptoms at this time. Currently working. Active and independent with ADLs.     Symptomatically, he reports:    1.  Full independence in activities of daily living.  2.  No dyspnea on exertion.   3.  Normal appetite. No unintentional weight loss.   4.  Pain score: 0/10   5.  IPSS (International Prostate Symptom Score):   1 / 35   - He is not experiencing urinary incontinence.      - He  "is not experiencing dysuria, hematuria, flank pain.  6.  Sexual function (LEVAR) Score:  25 / 25    - Use of erectile dysfunction medications:  None  7.  Bowel function:  normal   - Denies hematochezia or pain.    - He does not have a history of hemorrhoids.    - He does not have a history of inflammatory bowel disease (Crohn's, Ulcerative Colitis).    PAST MEDICAL HISTORY:  Past Medical History:   Diagnosis Date    Vision loss      PAST SURGICAL HISTORY:  Past Surgical History:   Procedure Laterality Date    NOSE SURGERY      AGE 11     ALLERGIES:  No Known Allergies  MEDICATIONS:    Current Outpatient Medications:     multivitamin tablet, Take 1 tablet by mouth once daily., Disp: , Rfl:     cholecalciferol (Vitamin D-3) 25 MCG (1000 UT) tablet, Take 1 tablet (25 mcg) by mouth once daily., Disp: , Rfl:     polyethylene glycol-electrolytes (polyethylene glycol) 420 gram solution, Take by mouth., Disp: , Rfl:    SOCIAL HISTORY:  Social History     Tobacco Use    Smoking status: Some Days     Types: Cigars    Smokeless tobacco: Never   Substance Use Topics    Alcohol use: Yes     Alcohol/week: 9.0 standard drinks of alcohol     Types: 2 Glasses of wine, 2 Cans of beer, 5 Standard drinks or equivalent per week     FAMILY HISTORY:  Family History   Problem Relation Name Age of Onset    COPD Mother SOY     Other (VASOVAGAL) Mother SOY     Atrial fibrillation Mother SOY     Cancer Father JOE LELA     Prostate cancer Father JOE LEAL        REVIEW OF SYSTEMS:  A 10 point review of systems was reviewed with pertinent positives and negatives noted in HPI. All other systems have been reviewed and are negative.  Review of Systems - Oncology    RADIATION SCREENING QUESTIONS:  Prior radiation therapy: No  Pacemaker: No  Other implantable devices: No  Connective tissue disease: No    PHYSICAL EXAMINATION:  /76   Pulse 69   Temp 36.5 °C (97.7 °F)   Resp 18   Ht 1.88 m (6' 2\")   Wt 102 kg (224 lb)   SpO2 98%  "  BMI 28.76 kg/m²   General: No acute distress, engaged in conversation, answers questions appropriately.  HEENT: Normocephalic, atraumatic. Extraocular movements are intact.   Pulmonary: Breathing comfortably on room air, no respiratory distress.  Cardiovascular: Regular rate, no cyanosis, grossly well-perfused.  Abdomen: Soft, nontender, nondistended.   Extremities: Ambulatory without assistance, moves all extremities spontaneously.  Musculoskeletal: Normal range of motion. Able to raise both arms above head without issues.  Lymph: No lower extremity edema.  Neurologic: Alert and oriented x3.    PERFORMANCE STATUS:  KPS/ECO, Fully active, able to carry on all pre-disease performed without restriction (ECOG equivalent 0)    LABORATORY AND IMAGING DATA:  Imaging: All imaging was personally reviewed and interpreted in clinic. Findings as per HPI and EMR.    Laboratory/Pathology:  All pertinent labs and pathology were personally reviewed and interpreted in clinic. Findings as per HPI and EMR.  Lab Results   Component Value Date    PSA 5.97 (H) 2023    PSA 4.2 (H) 2021       IMPRESSION:  53M with new unfavorable intermediate risk prostate cancer, bx 10/17/23, GG3, 7/18 cores involved, iPSA 6.71. cN0M0 by PSMA PET.    Patient doing well, ECOG 0. IPSS 1.    PLAN:  We discussed that he falls into the unfavorable intermediate risk class of localized prostate cancer, for which treatment options include surgery or definitive external beam radiation therapy (EBRT) with a short course of hormone therapy.  I discussed various EBRT treatment regimens, including moderate hypofractionation over 20 treatments, and ultra hypofractionation over 5 treatments. His IPSS is 1, and he would therefore be a good candidate for SBRT (5fx).      I discussed the logistics of radiation, including placement of fiducials into the prostate for improved and SpaceOAR to reduce radiation dose to the rectum.  Logistics of radiation  therapy including CT-based simulation and bowel and bladder preparation were discussed, as well as risks and benefits of radiation therapy with a detailed discussion of possible short- and long-term side effects including fatigue, toxicities to the bladder, urethra, and rectum, sexual dysfunction, infertility and secondary malignancy in the radiation field.     Potential side effects from hormonal suppression therapy were discussed, which include but are not limited to sexual dysfunction, increased risk of osteoporosis and bone fractures, vasomotor 'hot flashes', weight gain and body composition changes, elevated cholesterol and lipoprotein levels, increased risk for diabetes mellitus/hyperglycemia, cardiovascular disease, fatigue, anemia, gynecomastia, cognitive decline, and decrease in penis and/or testicle size.  The severity and/or permanence of such side effects are expected to be related to the duration of suppression. I encouraged him to exercise to help offset/reduce many of these symptoms, including bone density, weight changes, and muscle mass. Educated on daily Calcium and Vitamin D supplementation.     Lastly, he is eligible for enrollment on [facility]  (Parallel phase 3 randomized trials of genomic risk-stratified unfavorable intermediate risk prostate cancer: deintensification and intensification clinical trial evaluation).  I discussed that intermediate risk is a heterogeneous risk group,  stratifies patients based on genomic risk score, and will tailor treatment based on low or high score.  He is interested in enrolling, I will put him in contact with our clinical research nurse.    He asked a number of excellent questions that demonstrated good understanding, and would like to take some more time to make a treatment decision. Of note, he says he and his wife may be thinking about having another child.    -still deciding surgery vs RT, he requests that we call him the 2nd week of January to touch  base  - 010 candidate, will have clinical trials coordinator reach out with more information; if proceed with radiation, he is a good candidate for SBRT in 5fx, with ST-ADT (unless randomized on trial)  -will provide sperm banking facility information      Thank you for the opportunity to participate in the care of this pleasant gentleman.    Patient seen and discussed with Dr. Melissa Lopez.    Guero Segovia PGY-5      --    I personally saw and evaluated the patient. I personally obtained key and critical portions of the history and physical exam and personally reviewed and interpreted imaging and laboratory data. I reviewed and edited the resident's documentation, and discussed the patient with the resident. I agree with the residents plan and history as documented above.     Melissa Lopez MD PhD  , Radiation Oncology

## 2024-01-04 ENCOUNTER — OFFICE VISIT (OUTPATIENT)
Dept: UROLOGY | Facility: CLINIC | Age: 54
End: 2024-01-04
Payer: COMMERCIAL

## 2024-01-04 VITALS — BODY MASS INDEX: 28.31 KG/M2 | TEMPERATURE: 97.1 F | WEIGHT: 220.6 LBS | HEIGHT: 74 IN

## 2024-01-04 DIAGNOSIS — C61 PROSTATE CANCER (MULTI): Primary | ICD-10-CM

## 2024-01-04 PROCEDURE — 99214 OFFICE O/P EST MOD 30 MIN: CPT | Performed by: STUDENT IN AN ORGANIZED HEALTH CARE EDUCATION/TRAINING PROGRAM

## 2024-01-04 NOTE — LETTER
January 5, 2024     Samantha Hammer MD  [address] [facility] [address] [facility] [address]  [place] [facility] [address]    Patient: Judson Smith Jr.   YOB: 1970   Date of Visit: 1/4/2024       Dear Dr. Samatnha Hammer MD:    Thank you for referring Judson Smith to me for evaluation. Below are my notes for this consultation.  If you have questions, please do not hesitate to call me. I look forward to following your patient along with you.       Sincerely,     Andrea Brooke MD      CC: No Recipients  ______________________________________________________________________________________    HPI:  Proc (10/17/23): TP prostate biopsy   Path: prostatic adenocarcinoma, GG3 (Radha score 4+3=7), 60% of tissue, pattern 4 (30%), GG2 ([place] score 3+4=7), GG1 (Radha score 3+3=6)      53 year old male referred by Dr. Hammer for prostate cancer, dx GG2 (Radha 3+4=7). Hx of elevated PSA. MRI Prostate (5/8/23) showed BPH changes of the TZ, diffuse non nodular hypointensities within the PZ, without evidence of focally restricted diffusion (PI-RADS 2). S/p TP prostate biopsy (10/17/23) with pathology showing prostatic adenocarcinoma, GG3 (Radha score 4+3=7), 60% of tissue, pattern 4 (30%), GG2 (Radha score 3+4=7), GG1 ([place] score 3+3=6). Patient has seen radiation oncology, Dr. Lopez (12/15/23). Patient is moving forward with sperm banking. He is still considering surgery vs radiation.      Non-smoker   Baseline: Erections sufficient for intercourse  FHx: father had prostate cancer, treated with chemotherapy      PSA: 5.97 (1/17/23)     MRI Prostate (5/8/23): 38g  BPH changes of the TZ, diffuse non nodular hypointensities within the PZ, without evidence of focally restricted diffusion (PI-RADS 2).     Review of Systems:  All systems reviewed. Anything negative noted in the HPI.    Physical Exam:  Virtual visit.     Assessment/Plan  53 year old male referred by Dr. Hammer for prostate cancer, dx GG2 ([place] 3+4=7). Hx of elevated  PSA. MRI Prostate (5/8/23) showed BPH changes of the TZ, diffuse non nodular hypointensities within the PZ, without evidence of focally restricted diffusion (PI-RADS 2). S/p TP prostate biopsy (10/17/23) with pathology showing prostatic adenocarcinoma, GG3 (Radha score 4+3=7), 60% of tissue, pattern 4 (30%), GG2 (Radha score 3+4=7), GG1 ([place] score 3+3=6). Patient has seen radiation oncology, Dr. Lopez (12/15/23). Patient is moving forward with sperm banking. He is still considering surgery vs radiation. Management options including risks, benefits and alternatives discussed at length and all questions answered. Patient will call and schedule surgery if desires this treatment plan.           By signing my name below, I, Gildardo Saenz, attest that this documentation has been prepared under the direction and in the presence of Dr. Andrea Brooke.  All medical record entries made by the Scribe were at my direction and personally dictated by me. I have reviewed the chart and agree that the record accurately reflects my personal performance of the history, physical exam, discussion and plan.        Procedures

## 2024-01-04 NOTE — PROGRESS NOTES
HPI:  Proc (10/17/23): TP prostate biopsy   Path: prostatic adenocarcinoma, GG3 (Radha score 4+3=7), 60% of tissue, pattern 4 (30%), GG2 (Radha score 3+4=7), GG1 ([place] score 3+3=6)      53 year old male referred by Dr. Hammer for prostate cancer, dx GG2 ([place] 3+4=7). Hx of elevated PSA. MRI Prostate (5/8/23) showed BPH changes of the TZ, diffuse non nodular hypointensities within the PZ, without evidence of focally restricted diffusion (PI-RADS 2). S/p TP prostate biopsy (10/17/23) with pathology showing prostatic adenocarcinoma, GG3 (Radha score 4+3=7), 60% of tissue, pattern 4 (30%), GG2 (Radha score 3+4=7), GG1 ([place] score 3+3=6). Patient has seen radiation oncology, Dr. Lopez (12/15/23). Patient is moving forward with sperm banking. He is still considering surgery vs radiation.      Non-smoker   Baseline: Erections sufficient for intercourse  FHx: father had prostate cancer, treated with chemotherapy      PSA: 5.97 (1/17/23)     MRI Prostate (5/8/23): 38g  BPH changes of the TZ, diffuse non nodular hypointensities within the PZ, without evidence of focally restricted diffusion (PI-RADS 2).     Review of Systems:  All systems reviewed. Anything negative noted in the HPI.    Physical Exam:  Virtual visit.     Assessment/Plan   53 year old male referred by Dr. Hammer for prostate cancer, dx GG2 ([place] 3+4=7). Hx of elevated PSA. MRI Prostate (5/8/23) showed BPH changes of the TZ, diffuse non nodular hypointensities within the PZ, without evidence of focally restricted diffusion (PI-RADS 2). S/p TP prostate biopsy (10/17/23) with pathology showing prostatic adenocarcinoma, GG3 (Radha score 4+3=7), 60% of tissue, pattern 4 (30%), GG2 (Radha score 3+4=7), GG1 ([place] score 3+3=6). Patient has seen radiation oncology, Dr. Lopez (12/15/23). Patient is moving forward with sperm banking. He is still considering surgery vs radiation. Management options including risks, benefits and alternatives  discussed at length and all questions answered. Patient will call and schedule surgery if desires this treatment plan.           By signing my name below, I, Gildardo Saenz, attest that this documentation has been prepared under the direction and in the presence of Dr. Andrea Brooke.  All medical record entries made by the Scribe were at my direction and personally dictated by me. I have reviewed the chart and agree that the record accurately reflects my personal performance of the history, physical exam, discussion and plan.        Procedures

## 2024-02-12 ENCOUNTER — OFFICE VISIT (OUTPATIENT)
Dept: UROLOGY | Facility: HOSPITAL | Age: 54
End: 2024-02-12
Payer: COMMERCIAL

## 2024-02-12 DIAGNOSIS — Z31.62 ENCOUNTER FOR FERTILITY PRESERVATION COUNSELING: ICD-10-CM

## 2024-02-12 DIAGNOSIS — Z11.3 SCREENING EXAMINATION FOR STD (SEXUALLY TRANSMITTED DISEASE): ICD-10-CM

## 2024-02-12 DIAGNOSIS — R39.9 LOWER URINARY TRACT SYMPTOMS (LUTS): ICD-10-CM

## 2024-02-12 DIAGNOSIS — N52.8 OTHER MALE ERECTILE DYSFUNCTION: Primary | ICD-10-CM

## 2024-02-12 PROCEDURE — 99214 OFFICE O/P EST MOD 30 MIN: CPT | Performed by: UROLOGY

## 2024-02-12 RX ORDER — TADALAFIL 5 MG/1
5 TABLET ORAL DAILY
Qty: 30 TABLET | Refills: 11 | Status: SHIPPED | OUTPATIENT
Start: 2024-02-12 | End: 2024-03-13

## 2024-02-12 NOTE — PROGRESS NOTES
HPI  53 y.o. M presents today for sperm banking information, referred by Dr. Brooke.     Proc (10/17/23): TP prostate biopsy   Path: prostatic adenocarcinoma, GG3 (Radha score 4+3=7), 60% of tissue, pattern 4 (30%), GG2 (Radha score 3+4=7), GG1 (Radha score 3+3=6)      53 year old male referred by Dr. Hammer for prostate cancer, dx GG2 (Radha 3+4=7). Hx of elevated PSA. MRI Prostate (5/8/23) showed BPH changes of the TZ, diffuse non nodular hypointensities within the PZ, without evidence of focally restricted diffusion (PI-RADS 2). S/p TP prostate biopsy (10/17/23) with pathology showing prostatic adenocarcinoma, GG3 (Radha score 4+3=7), 60% of tissue, pattern 4 (30%), GG2 ([place] score 3+4=7), GG1 ([place] score 3+3=6). Patient has seen radiation oncology, Dr. Lopez (12/15/23). Patient is moving forward with sperm banking. He is still considering surgery vs radiation.     -has 1 child, 13yo, adopted  -leaning towards prostatectomy  -erections are fine as of now, 9/10    Lab Results   Component Value Date    PSA 5.97 (H) 01/17/2023       Current Medications:  Current Outpatient Medications   Medication Sig Dispense Refill    cholecalciferol (Vitamin D-3) 25 MCG (1000 UT) tablet Take 1 tablet (25 mcg) by mouth once daily.      multivitamin tablet Take 1 tablet by mouth once daily.      polyethylene glycol-electrolytes (polyethylene glycol) 420 gram solution Take by mouth.       No current facility-administered medications for this visit.        PMH:  Past Medical History:   Diagnosis Date    Vision loss        PSH:  Past Surgical History:   Procedure Laterality Date    NOSE SURGERY      AGE 11       FMH:  Family History   Problem Relation Name Age of Onset    COPD Mother SOY     Other (VASOVAGAL) Mother SOY     Atrial fibrillation Mother OSY     Cancer Father JOE LEAL     Prostate cancer Father JOE LEAL        SHx:  Social History     Tobacco Use    Smoking status: Some Days     Types: Cigars     Smokeless tobacco: Never   Vaping Use    Vaping Use: Never used   Substance Use Topics    Alcohol use: Yes     Alcohol/week: 9.0 standard drinks of alcohol     Types: 2 Glasses of wine, 2 Cans of beer, 5 Standard drinks or equivalent per week    Drug use: Never       Allergies:  No Known Allergies    Physical Exam   CONSTITUTIONAL:        No acute distress    HEAD:        Normocephalic and atraumatic    CHEST / RESPIRATORY      no excess work of breathing, no respiratory distress,    ABDOMEN / GASTROINTESTINAL:        Abdomen nondistended      Assessment/Plan  #Fertility Preservation  -sperm banking x3    #ED  -start daily cialis 5mg, med counseling done    Fu virtually after sperm banking.    Scribe Attestation  By signing my name below, I, Gildardo Reynoso, attest that this documentation  has been prepared under the direction and in the presence of Jim Molina MD.

## 2024-02-20 DIAGNOSIS — C61 PROSTATE CANCER (MULTI): Primary | ICD-10-CM

## 2024-03-03 NOTE — PROGRESS NOTES
Oncology New Patient Visit  Patient ID: Judson Smith Jr. is a 53 y.o. male who presents today to [facility] care.  Referring Physician: Melissa Lopez MD PhD  [id number]  [place] [address]  Primary Care Provider: No primary care provider on file.  Jim Ho  PCP    Cancer History  Prostate Cancer - II c, Ct1c, PSA 6.7, GG3 - Unfavorable Intermediate Risk   Treatment  -elevated PSA, CHANDLER pi rads 2, 5/23, PET PSMA 9/23 - SUV 11.8 within the prostate c/w prostate neoplasm, no SVI no estrella mets.  Indeterminate sclerosis in the bones,  Biopsy 10/23/ TP fusion biopsy  - GG3+4=7, GG2, 60% of tissue, GG4+3=7, GG3 30% of tissue, GG3+3=6, GG1,     Other contributing history  ED     HPI  Referred by provider/s above.  Oncology history is summarized above.  The patient denies any significant ongoing issues or worsening nausea, vomiting, fevers, chills, easy bruising or bleeding chest pain melena shortness of breath diarrhea or worsening fatigue.  The patient is without any worsening lower urinary tract symptoms.  Does note some issues with erectile dysfunction and did recently have follow-up with urology.    ROS:  10-pt ROS reviewed and negative except as mentioned above.    Medications: below was reviewed and is accurate  Current Outpatient Medications   Medication Instructions    cholecalciferol (Vitamin D-3) 25 MCG (1000 UT) tablet 1 tablet, oral, Daily    multivitamin tablet 1 tablet, oral, Daily    polyethylene glycol-electrolytes (polyethylene glycol) 420 gram solution oral    tadalafil (CIALIS) 5 mg, oral, Daily        Past Medical History  Past Medical History:   Diagnosis Date    Vision loss      FamilyHistory  Family History   Problem Relation Name Age of Onset    COPD Mother SOY     Other (VASOVAGAL) Mother SOY     Atrial fibrillation Mother SOY     Cancer Father JUDSON SR     Prostate cancer Father JUDSON SR      MGM - kidney  "cancer  M - lung cancer    Past Surgical History  Past Surgical History:   Procedure Laterality Date    NOSE SURGERY      AGE 11     Social History  Lives in [facility] /    / 13 year old    He  reports current alcohol use of about 9.0 standard drinks of alcohol per week.  He  reports no history of drug use.    Physical exam:  Vitals:    03/05/24 1604   BP: 124/81   Pulse: 67   Resp: 16   Temp: 36.1 °C (97 °F)   TempSrc: Core   SpO2: 99%   Weight: 99.4 kg (219 lb 2.2 oz)   Height: 1.837 m (6' 0.32\")       GEN: NAD, well-appearing  HEENT: no clear lesions seen  NECK: no clear masses  LYMPH: no palpable adenopathy  SKIN: no concerning new lesions  LUNGS: CTA  CV: RRR no clear R/G  ABD: Soft, NTND. No rebound or guarding.  EXT:  trace edema bilaterally   NEURO: Grossly intact. No focal deficits.  PSYCH: Normal mood and affect    Radiology review  Reviewed in detail personally and for detail see results in EPIC        Genomics Data    Laboratory review  Reviewed in detail personally and for detail see results in EPIC  Lab Results   Component Value Date    WBC 3.9 (L) 10/16/2023    WBC 5.3 01/17/2023     Lab Results   Component Value Date    HGB 12.6 (L) 10/16/2023    HGB 13.2 (L) 01/17/2023     Lab Results   Component Value Date     10/16/2023     01/17/2023     Lab Results   Component Value Date    GLUCOSE 93 10/16/2023    CALCIUM 9.5 10/16/2023     (L) 10/16/2023    K 4.2 10/16/2023    CO2 27 10/16/2023     10/16/2023    BUN 19 10/16/2023    CREATININE 1.17 10/16/2023     Lab Results   Component Value Date    PSA 5.97 (H) 01/17/2023    PSA 4.2 (H) 01/12/2021     Lab Results   Component Value Date    ALT 12 10/16/2023    AST 14 10/16/2023    ALKPHOS 54 10/16/2023    BILITOT 0.7 10/16/2023         ASSESSMENT AND PLAN   We had a long discussion regarding the natural history, prognosis, and potential treatment options for his disease.  We discussed timing of therapy and next " line standard options as well as clinical trial options for his current disease state. Discussed also NCCN guidlines as per the patients diagnosis and treatment options.  The Total Visit time for this visit was minutes , which includes the following :  face to face time during the visit today if in person, phone / virtual time with the patient,  review of records, including office notes, pathology, radiology, labs, and prior treatments and care coordination. This review directly influenced my assessment and recommendations for this visit.    Prostate Cancer -discussed in detail options for unfavorable intermediate risk prostate cancer which include but are not limited to definitive surgery, radiation, the role of radiation with 6 months of ADT options for ADT including injection and/or relugolix pros and cons of each option.  Discussed with recommend also potential discussion regarding clinical trial versus looking at restratification of clinical trial based on decipher score and in low risk radiation alone versus high risk patient randomization of ADT and radiation versus ADT and darolutamide with radiation risk benefits discussed in detail the risk benefits and alternatives and complications and efficacy outcomes with radiation or surgical approaches.  At this point he is still unclear how he would like to proceed the last scans were performed in September 2023 at that point the PET just showed some nonspecific findings in the anterior iliac bone with minimal osteoporosis with SUVs of 2.4 and SUVs in the left posterior iliac bone with minimal sclerosis.  Low clinical suspicion for advanced metastatic disease once he the patient decides how he wants to proceed we will help coordinate care if ADT is needed unclear utility of repeat PET scans to monitor findings on last PSA continues to rise, last PET PSMA was done with PSA 6.71.  Also with multiple family history do recommend referral to genetics.  Contact  information was given and will contact us once he decides next steps regarding treatment.  Discussed timing of repeat biopsy if trial and role of Decipher score    Dean Tsai MD  Senior Attending Physician/  in Medicine [employer]  [facility]  Patient line [phone]  Fax [phone]

## 2024-03-04 ENCOUNTER — PATIENT OUTREACH (OUTPATIENT)
Dept: HEMATOLOGY/ONCOLOGY | Facility: HOSPITAL | Age: 54
End: 2024-03-04
Payer: COMMERCIAL

## 2024-03-04 NOTE — PROGRESS NOTES
03/04/2024 1522: Patient is self referred to see Dr. Tasi for his intermediate risk prostate cancer. Patient underwent biopsy on 10/17/2023 for an elevated PSA of 6.71 drawn 07/28/2023. PET PSMA showed no PSMA expressing lesions in head/neck or chest. PSMA expression in prostate consistent with known prostate cancer, no enlarged lymph nodes. No definite PSMA-expressing lesions in bones and soft tissue suspicious for metastases.  Subtle foci of faintly increased uptake in the bone corresponding to minimal sclerosis, indeterminant. Per notes patient may have requested the referral to discuss his diagnosis further with Dr. Tsai. Called patient to discuss, yet, had to leave messages at both of his numbers. All patients information is in the system. Have requested his PSMA Pet be pushed into PACS from [facility]. Coleman Jaeger RN.

## 2024-03-05 ENCOUNTER — OFFICE VISIT (OUTPATIENT)
Dept: HEMATOLOGY/ONCOLOGY | Facility: CLINIC | Age: 54
End: 2024-03-05
Payer: COMMERCIAL

## 2024-03-05 VITALS
TEMPERATURE: 97 F | BODY MASS INDEX: 29.68 KG/M2 | SYSTOLIC BLOOD PRESSURE: 124 MMHG | HEART RATE: 67 BPM | RESPIRATION RATE: 16 BRPM | OXYGEN SATURATION: 99 % | WEIGHT: 219.14 LBS | HEIGHT: 72 IN | DIASTOLIC BLOOD PRESSURE: 81 MMHG

## 2024-03-05 DIAGNOSIS — C61 PROSTATE CANCER (MULTI): ICD-10-CM

## 2024-03-05 PROCEDURE — 99214 OFFICE O/P EST MOD 30 MIN: CPT | Performed by: INTERNAL MEDICINE

## 2024-03-05 PROCEDURE — 99204 OFFICE O/P NEW MOD 45 MIN: CPT | Performed by: INTERNAL MEDICINE

## 2024-03-05 ASSESSMENT — ENCOUNTER SYMPTOMS
LOSS OF SENSATION IN FEET: 0
DEPRESSION: 0
OCCASIONAL FEELINGS OF UNSTEADINESS: 0

## 2024-03-05 ASSESSMENT — PAIN SCALES - GENERAL: PAINLEVEL: 0-NO PAIN

## 2024-03-27 ENCOUNTER — TELEMEDICINE (OUTPATIENT)
Dept: UROLOGY | Facility: CLINIC | Age: 54
End: 2024-03-27
Payer: COMMERCIAL

## 2024-03-27 DIAGNOSIS — C61 PROSTATE CANCER (MULTI): Primary | ICD-10-CM

## 2024-03-27 PROCEDURE — 99214 OFFICE O/P EST MOD 30 MIN: CPT | Performed by: STUDENT IN AN ORGANIZED HEALTH CARE EDUCATION/TRAINING PROGRAM

## 2024-04-20 ENCOUNTER — PATIENT MESSAGE (OUTPATIENT)
Dept: RADIATION ONCOLOGY | Facility: HOSPITAL | Age: 54
End: 2024-04-20
Payer: COMMERCIAL

## 2024-05-14 ENCOUNTER — OFFICE VISIT (OUTPATIENT)
Dept: GENETICS | Facility: CLINIC | Age: 54
End: 2024-05-14
Payer: COMMERCIAL

## 2024-05-14 VITALS
WEIGHT: 223 LBS | TEMPERATURE: 97.7 F | SYSTOLIC BLOOD PRESSURE: 113 MMHG | RESPIRATION RATE: 17 BRPM | BODY MASS INDEX: 30.2 KG/M2 | DIASTOLIC BLOOD PRESSURE: 71 MMHG | HEART RATE: 60 BPM | HEIGHT: 72 IN

## 2024-05-14 DIAGNOSIS — Z91.89 AT RISK FOR HEREDITARY CANCER-PREDISPOSING SYNDROME: ICD-10-CM

## 2024-05-14 DIAGNOSIS — C61 PROSTATE CANCER (MULTI): Primary | ICD-10-CM

## 2024-05-14 DIAGNOSIS — Z80.9 FAMILY HISTORY OF CANCER: ICD-10-CM

## 2024-05-14 PROCEDURE — 99417 PROLNG OP E/M EACH 15 MIN: CPT | Performed by: STUDENT IN AN ORGANIZED HEALTH CARE EDUCATION/TRAINING PROGRAM

## 2024-05-14 PROCEDURE — 99205 OFFICE O/P NEW HI 60 MIN: CPT | Performed by: STUDENT IN AN ORGANIZED HEALTH CARE EDUCATION/TRAINING PROGRAM

## 2024-05-14 ASSESSMENT — ENCOUNTER SYMPTOMS
DEPRESSION: 0
LOSS OF SENSATION IN FEET: 0
OCCASIONAL FEELINGS OF UNSTEADINESS: 0

## 2024-05-14 ASSESSMENT — PATIENT HEALTH QUESTIONNAIRE - PHQ9
1. LITTLE INTEREST OR PLEASURE IN DOING THINGS: NOT AT ALL
2. FEELING DOWN, DEPRESSED OR HOPELESS: NOT AT ALL
SUM OF ALL RESPONSES TO PHQ9 QUESTIONS 1 AND 2: 0

## 2024-05-14 NOTE — LETTER
05/14/24    Dean Tsai MD  [address] [facility] [address]      Dear Dr. Dean Tsai MD,    Thank you for referring your patient, Judson Smith Jr., to receive care in my office. I have enclosed a summary of the care provided to Judson on 05/14/24.    Please contact me with any questions you may have regarding the visit.    Sincerely,         Adilson Grewal MD PhD  [address] [facility] [address]  [facility] [id number]    CC: No Recipients

## 2024-05-14 NOTE — PROGRESS NOTES
Basic info:  Judson Smith Jr.  Male, 53 y.o., 1970  MRN: [id number]    Requesting physician/service:  Reason for the visit / consultation:      Referred by: Dean Tsai MD  For: Also with multiple family history of prostate cancer    History of Present Illness:    The patient presented to the clinic by himself    Mr. Smith is a 52 y/o male with Prostate cancer  - cT1c, PSA 6.7, GG3 - Unfavorable Intermediate Risk. He was first diagnosed at the beginning of 2023. The cancer was being monitored, and now he is at the stage where he needs treatment.    -elevated PSA, MRI PiRADS 2, 5/23, PET PSMA 9/23 - SUV 11.8 within the prostate c/w prostate neoplasm, no SVI no estrella mets.  Indeterminate sclerosis in the bones,  Biopsy 10/23/ TP fusion biopsy  - GG3+4=7, GG2, 60% of tissue, GG4+3=7, GG3 30% of tissue, GG3+3=6, GG1,     Urology reports:  Proc (10/17/23): TP prostate biopsy   Path: prostatic adenocarcinoma, GG3 (Radha score 4+3=7), 60% of tissue, pattern 4 (30%), GG2 ([place] score 3+4=7), GG1 (Radha score 3+3=6)      He has plans to freeze sperm? - will reach back out to oncology when he has decided on a treatment plan. He has decided on radiation and has an appointment scheduled for 05/23.     Review of Systems:    Constitutional: No concerns reported  Head and Neck: No concerns reported  Eyes:  Dry eyes   ENT:  Hay fever  Respiratory:  No concerns reported  Cardiovascular: No concerns reported  Gastrointestinal:  No concerns reported  Genitourinary:  Prostate cancer  Reproductive/Endocrine:  No concerns reported, ED  Musculoskeletal:  No concerns reported  Hematology/Lymphatic:  No concerns reported  Skin: No concerns reported  Neurological:  No concerns reported  Psychiatric: No concerns reported       Pertinent positive and negative ROS are listed in HPI, PMH and above, otherwise reviewed in detail for 15 systems and negative      Past Medical History:     ED   Dry eyes. Vision loss      Past Surgical History:      [employer] bridge therapy (hit by the baseball when playing) - age 11  Prostate biopsy     Allergies:  NKA      Medications:    Current Outpatient Medications  Medication Instructions   cholecalciferol (Vitamin D-3) 25 MCG (1000 UT) tablet 1 tablet, oral, Daily   multivitamin tablet 1 tablet, oral, Daily   polyethylene glycol-electrolytes (polyethylene glycol) 420 gram solution oral   tadalafil (CIALIS) 5 mg, oral, Daily    Family History:    MGM - kidney cancer  M - lung cancer    (Ref: scanned pedigree in the EMR)    Paternal ancestry:    Maternal ancestry: [place], [place]ian  Ashkenazi Presybeterian ancestry: none    #First degree relatives:  - Father: had prostate cancer at around 80 y/o,  at 90 y/o   - Mother: 85 y/o, lung cancer likely due to secondhand smoke,   - Full sibling: oldest brother testicular tumor, healthy 53 y/o,half brother 39 y/o, half sister 35 y/o   - Offspring: healthy    #Paternal relatives:   - Paternal grandfather: unknown  - Paternal grandmother: healthy and  at 79 y/o   - Paternal uncle/aunt: brain cancer at around 51 y/o  at 59 y/o    #Maternal relatives:  - Maternal grandfather: unknown   - Maternal grandmother: pancreatic or kidney cancer at 59 y/o,  at 59 y/o, schizophrenia   - Maternal uncle/aunt: uncle lung cancer, aunt tumor on leg    Other than the above, upon our specific inquiries, there is no report of family history of intellectual disability or learning disability, autism, birth defect, multiple miscarriage or stillbirth, infant or early childhood death, consanguinity, and other known genetic disease.     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   He  reports current alcohol use of about 9.0 standard drinks of alcohol per week.  He reports no  "history of drug use.  Smoke Cigar once 3 months      Examination:       Basic Vital Sign Results:   Blood pressure 113/71, pulse 60, temperature 36.5 °C (97.7 °F), temperature source Tympanic, resp. rate 17, height 1.837 m (6' 0.32\"), weight 101 kg (223 lb).    Physical Exam:   General: No distress. Spontaneous movements. Appears well-developed and well-nourished.   Skin: No rashes or jaundice. Warm and dry.    HEENT:  Hair: No abnormal texture and distribution    Head Shape: normocephalic, atraumatic   Face:  no asymmetry for facial expression, juvenile gingivitis/ periodontal disease, prominent teeth - right front incisor   Eyes:  No hypertelorism, no periorbital edema, no epicanthal folds. No discharge or swelling. No scleral icterus or injection. PERRLA. EOM intact bilaterally without nystagmus.   Ears:  set and rotation within normal limit, no pits or tags  Nose: no apparent dysmorphology found  Mouth: no microretrognathia, no high palate, single uvula  Neck:  no webbing      Torso:   Chest: No respiratory distress. Breath sounds clear. Lungs: CTA  Heart: Warm and well-perfused without cyanosis. No murmur heard.  Abdomen:  Soft and flat. No TTP. No guarding, no rebound pain.   Back:  No CV angle knocking pain.  Genitalia: Deferred    Ext & Neuro:  Extremities:  No evidence of joint contractures or hypermobility. No abnormal palmar creasing, no arachnodactyly, no clinodactyly, no syndactyly, no overlapping fingers. No pitting edema.   Neurological: Good head control. Spontaneous movements of arms and legs. Normal tone. No evidence of spasticity. There were no involuntary movements or tremors. Muscle power 5/5 Elbow flex/ext, 5/5 hand , 5/5 knee flex/ext  Psychiatric: Alert and awake. Appropriate response to the exam for the age.   Oriented to person, place, and time. No abnormal mood and affect. No abnormal speech and behavior. Judgment and thought content normal.     Lab Results:      Lab " Results  Component Value Date    WBC 3.9 (L) 10/16/2023    WBC 5.3 01/17/2023     Lab Results  Component Value Date    HGB 12.6 (L) 10/16/2023    HGB 13.2 (L) 01/17/2023     Lab Results  Component Value Date     10/16/2023     01/17/2023     Lab Results  Component Value Date    GLUCOSE 93 10/16/2023    CALCIUM 9.5 10/16/2023     (L) 10/16/2023    K 4.2 10/16/2023    CO2 27 10/16/2023     10/16/2023    BUN 19 10/16/2023    CREATININE 1.17 10/16/2023     Lab Results  Component Value Date    PSA 5.97 (H) 01/17/2023    PSA 4.2 (H) 01/12/2021     Lab Results  Component Value Date    ALT 12 10/16/2023    AST 14 10/16/2023    ALKPHOS 54 10/16/2023    BILITOT 0.7 10/16/2023       Diagnostic Imaging:      NM PET CT [facility] 06/23/2023  Abdomens and Pelvis:   - PSMA-expressing lesions in the prostate is consistent with known   neoplasm.  Prostate bed: There is diffuse heterogeneous increased tracer uptake of  max SUV 11.8, consistent with known neoplasm    05/08/2023 MRI Prostate vandana boundries    PROSTATE VOLUME:   The prostate measures  3.6 cm x  4.4 cm  x  4.6 cm in right-to-left,   anterior-posterior and craniocaudal dimension.      Prostate weight is estimated at 38g. PSA density is 0.16 ng/mL/g.      PROSTATE PARENCHYMA:   There is heterogeneous appearance of the transition zone, consistent   with benign prostatic hyperplasia. The peripheral zone is diffusely   heterogenous on T2WI, with bilateral polygonal and wedge-shaped   T2-hypointense areas, that show no signs of abnormally restricted   diffusion (PI-RADS 2).       Assessment & Recommendations:     Luis Tierney Judson RENDON is a 54 y/o male with:  - Prostate cancer:   -- cT1c, PSA 6.7, [place] Score 4+3=7, PSA 6.7  -- Unfavorable intermediate    - Family history of cancers  -- Dad has prostate cancer, mom has lung cancer (non-smoker)  -- Maternal grandmother: pancreatic cancer or kidney cancer  - Maternal uncle lung cancer   - Paternal  uncle brain cancer     Plan:  ## Genetic testing: Multi-cancer genetics panel    We discussed:  - the basics of genetics  - the difference between germline genetics versus somatic genetics  - the potential benefits or information for the patient and for the other family members  - benefits, risks, and alternatives  - MITALI (Genetic Information Nondiscrimination Act)  - possible results of positive, negative and VUS    We discussed the basics of genetics and discussed benefits, risks, and alternatives, including MITALI (Genetic Information Nondiscrimination Act). We discussed possible results, including VUS and incidental findings.     ## Return to the clinic in 3 months       Attending Physician Statement:        The history was reviewed with the family and is detailed above. I performed a physical examination which is documented above. The impression and plan were reviewed with the patient/family extensively and are documented above. I have reviewed and edited the above note. Greater than 50% of the time was spent on patient counseling and coordination of care.    Prep 5 minutes  Face to face 66 minutes  Additional Patient Care Activities 0 minutes  Documentation 8 minutes  Other 0 minutes  Total 79 minutes    --  Adilson Grewal MD, PhD  Director, Urogenetics Program, Department of Genetics and Genome Sciences  Chief, Urogenetics Division, [facility] [place]   and Attending in Genetics and Urology  [facility], [facility], and [facility]

## 2024-05-20 ENCOUNTER — TELEPHONE (OUTPATIENT)
Dept: GENETICS | Facility: CLINIC | Age: 54
End: 2024-05-20
Payer: COMMERCIAL

## 2024-05-20 NOTE — TELEPHONE ENCOUNTER
I left a message for the patient this afternoon regarding an issue with their genetic testing sample. The patients' sample was received by [employer] without any patient identifiers. I requested the patient return my call to further discuss. [facility] [place] - [phone]     air-conditioning, change or clean all filters every month. Take a shower and change your clothes after you have been outside. · Stay inside when pollen counts are high. Vacuum once or twice a week. Use a vacuum  with a HEPA filter or a double-thickness filter. When should you call for help? Give an epinephrine shot if:    · You think you are having a severe allergic reaction.    After giving an epinephrine shot, call 911, even if you feel better.   Call 911 if:    · You have symptoms of a severe allergic reaction. These may include:  ? Sudden raised, red areas (hives) all over your body. ? Swelling of the throat, mouth, lips, or tongue. ? Trouble breathing. ? Passing out (losing consciousness). Or you may feel very lightheaded or suddenly feel weak, confused, or restless.     · You have been given an epinephrine shot, even if you feel better.    Call your doctor now or seek immediate medical care if:    · You have symptoms of an allergic reaction, such as:  ? A rash or hives (raised, red areas on the skin). ? Itching. ? Swelling. ? Belly pain, nausea, or vomiting.    Watch closely for changes in your health, and be sure to contact your doctor if:    · You do not get better as expected. Where can you learn more? Go to [email or web address] org and sign in to your [employer] account. Enter J912 in the [facility] box to learn more about \"Seasonal Allergies: Care Instructions. \"     If you do not have an account, please click on the \"Sign Up Now\" link. Current as of: June 27, 2018  Content Version: 12.0  © 1720-4799 [employer]. Care instructions adapted under license by [employer]. If you have questions about a medical condition or this instruction, always ask your healthcare professional. [address] any warranty or liability for your use of this information.

## 2024-05-23 ENCOUNTER — HOSPITAL ENCOUNTER (OUTPATIENT)
Dept: RADIATION ONCOLOGY | Facility: CLINIC | Age: 54
Setting detail: RADIATION/ONCOLOGY SERIES
Discharge: HOME | End: 2024-05-23
Payer: COMMERCIAL

## 2024-05-23 VITALS
WEIGHT: 218.2 LBS | HEART RATE: 65 BPM | TEMPERATURE: 96.8 F | RESPIRATION RATE: 18 BRPM | BODY MASS INDEX: 29.55 KG/M2 | HEIGHT: 72 IN | SYSTOLIC BLOOD PRESSURE: 118 MMHG | OXYGEN SATURATION: 98 % | DIASTOLIC BLOOD PRESSURE: 74 MMHG

## 2024-05-23 DIAGNOSIS — C61 MALIGNANT NEOPLASM OF PROSTATE (MULTI): ICD-10-CM

## 2024-05-23 DIAGNOSIS — C61 PROSTATE CANCER (MULTI): Primary | ICD-10-CM

## 2024-05-23 PROCEDURE — 99213 OFFICE O/P EST LOW 20 MIN: CPT | Performed by: STUDENT IN AN ORGANIZED HEALTH CARE EDUCATION/TRAINING PROGRAM

## 2024-05-23 PROCEDURE — 99214 OFFICE O/P EST MOD 30 MIN: CPT | Performed by: STUDENT IN AN ORGANIZED HEALTH CARE EDUCATION/TRAINING PROGRAM

## 2024-05-23 ASSESSMENT — ENCOUNTER SYMPTOMS
EYES NEGATIVE: 1
CONSTITUTIONAL NEGATIVE: 1
NEUROLOGICAL NEGATIVE: 1
PSYCHIATRIC NEGATIVE: 1
CARDIOVASCULAR NEGATIVE: 1
HEMATOLOGIC/LYMPHATIC NEGATIVE: 1
RESPIRATORY NEGATIVE: 1
ENDOCRINE NEGATIVE: 1
MUSCULOSKELETAL NEGATIVE: 1
GASTROINTESTINAL NEGATIVE: 1

## 2024-05-23 ASSESSMENT — COLUMBIA-SUICIDE SEVERITY RATING SCALE - C-SSRS
6. HAVE YOU EVER DONE ANYTHING, STARTED TO DO ANYTHING, OR PREPARED TO DO ANYTHING TO END YOUR LIFE?: NO
2. HAVE YOU ACTUALLY HAD ANY THOUGHTS OF KILLING YOURSELF?: NO
1. IN THE PAST MONTH, HAVE YOU WISHED YOU WERE DEAD OR WISHED YOU COULD GO TO SLEEP AND NOT WAKE UP?: NO

## 2024-05-23 ASSESSMENT — PAIN SCALES - GENERAL: PAINLEVEL: 0-NO PAIN

## 2024-05-23 ASSESSMENT — LIFESTYLE VARIABLES
AUDIT-C TOTAL SCORE: 5
HOW OFTEN DO YOU HAVE A DRINK CONTAINING ALCOHOL: 2-4 TIMES A MONTH
HOW MANY STANDARD DRINKS CONTAINING ALCOHOL DO YOU HAVE ON A TYPICAL DAY: 5 OR 6
HOW OFTEN DO YOU HAVE SIX OR MORE DRINKS ON ONE OCCASION: LESS THAN MONTHLY
SKIP TO QUESTIONS 9-10: 0

## 2024-05-23 NOTE — PROGRESS NOTES
Staff Physician: Melissa Lopez MD PhD  Date of Service: 2024    RADIATION ONCOLOGY FOLLOW UP NOTE  IDENTIFYING DATA:  Patient name: Judson Smith Jr.   MRN: [id number]     Cancer Staging   Prostate cancer (Multi)  Staging form: Prostate, AJCC 8th Edition  - Clinical stage from 10/17/2023: Stage IIC (cT1c, cN0, cM0, PSA: 6.7, Grade Group: 3) - Signed by Melissa Lopez MD PhD on 12/15/2023    Problem List Items Addressed This Visit       Prostate cancer (Multi) - Primary    Relevant Orders    Rad Onc Intent to Treat     He was last seen in Radiation Oncology on 12/15/23 and returns today for routine follow-up.    Oncologic History:  23 MRI prostate w/wo contrast: BPH changes of the TZ. Diffuse non nodular hypointensities within the PZ, without evidence of focally restricted diffusion (PI-RADS 2).     23 PSMA PET: showed diffuse heterogeneous increased tracer uptake of max SUV 11.8 consistent with known neoplasm. No SVI. Negative for estrella or distant metastasis.     10/17/23 TP prostate biopsy:  systematic cores involved with prostatic adenocarcinoma, GG3.      23 6.71  23 5.97  22 4.47  21 4.2    Interval History:  24 PSA 7.9    Today, he is here by himself and reports feeling well. IPSS 0. Denies any new urinary or bowel side effects. Denies any unintentional weight gain or loss. Denies any pain. Has not undergone sperm banking as he thought this was not necessary if he were proceeding with radiation.     A 10 point review of systems was reviewed with pertinent positives and negatives noted in HPI. All other systems have been reviewed and are negative.    PERFORMANCE STATUS:  Karnofsky Performance Score/ECO, Fully active, able to carry on all pre-disease performed without restriction (ECOG equivalent 0)    PHYSICAL EXAMINATION:  /74 (BP Location: Right arm, Patient Position: Sitting, BP Cuff Size: Adult)   Pulse 65   Temp 36 °C (96.8 °F) (Temporal)   Resp  "18   Ht 1.837 m (6' 0.32\")   Wt 99 kg (218 lb 3.2 oz)   SpO2 98%   BMI 29.33 kg/m²   Constitutional: well developed, no distress, alert & oriented, cooperative  Eyes: pupils equal round and reactive to light, extraocular movements intact  Respiratory: normal work of breathing  Extremities: no clubbing or edema    DIAGNOSTIC REPORTS REVIEWED:  Imaging: All imaging was personally reviewed and interpreted in clinic. Findings as per interval history and EMR.  Laboratory/Pathology:  All pertinent labs and pathology were personally reviewed and interpreted in clinic. Findings as per interval history and EMR.  Lab Results   Component Value Date    PSA 5.97 (H) 01/17/2023    PSA 4.2 (H) 01/12/2021     IMPRESSION:  53 year old man with new unfavorable intermediate risk prostate cancer, (iPSA 6.7, GG3 7/18 cores+), cN0M0 by PSMA PET.     PSA increased to 7.9. He reports he would like to proceed with radiation. I discussed the logistics of radiation, including placement of fiducials into the prostate for improved and spacer gel to reduce radiation dose to the rectum.  I discussed possible short- and long-term side effects including fatigue, toxicities to the bladder, urethra, and rectum, sexual dysfunction, infertility and secondary malignancy in the radiation field. As such, he will go have sperm banking performed since he is interested in pursing RT.     Potential side effects from hormonal suppression therapy were discussed, which include but are not limited to sexual dysfunction, increased risk of osteoporosis and bone fractures, vasomotor 'hot flashes', weight gain and body composition changes, elevated cholesterol and lipoprotein levels, increased risk for diabetes mellitus/hyperglycemia, cardiovascular disease, fatigue, anemia, gynecomastia, cognitive decline, and decrease in penis and/or testicle size.  The severity and/or permanence of such side effects are expected to be related to the duration of suppression. I " encouraged him to exercise to help offset/reduce many of these symptoms, including bone density, weight changes, and muscle mass.     Lastly, he is still interested in NRG  (Parallel phase 3 randomized trials of genomic risk-stratified unfavorable intermediate risk prostate cancer: deintensification and intensification clinical trial evaluation).  He agrees for Decipher to be sent at this time. His prior biopsy was in 10/2023. He understands that the biopsy needs to be within 270 days in order to enroll on . He is willing to repeat biopsy at the time of spacer and fiducial placement if he ultimately decides to proceed with enrolling on study.     PLAN:  Decipher sent  He will coordinate sperm banking   Return to clinic in 4 weeks    He knows to call with any questions or concerns in the interim.    DISEASE STATUS: not treated    Melissa Lopez MD PhD  , Radiation Oncology

## 2024-05-23 NOTE — PROGRESS NOTES
Radiation Oncology Nursing Note    IPSS (International Prostate Symptom Score):   0 / 35, bother 0   - He is not experiencing urinary incontinence.  Cilais 5mg    - He is not experiencing dysuria, hematuria, flank pain.     Sexual function:   - Quality of erections during the last 4 weeks: 4 = Firm enough for intercourse  - Use of erectile dysfunction medications:  None    Bowel function:    - Denies hematochezia or pain.     Current Systemic Treatment:  No     Pain: The patient's current pain level was assessed.  They report currently having a pain of 0 out of 10.  They feel their pain is under control without the use of pain medications.    Review of Systems:  Review of Systems   Constitutional: Negative.    HENT:  Negative.     Eyes: Negative.    Respiratory: Negative.     Cardiovascular: Negative.    Gastrointestinal: Negative.    Endocrine: Negative.    Genitourinary: Negative.     Musculoskeletal: Negative.    Skin: Negative.    Neurological: Negative.    Hematological: Negative.    Psychiatric/Behavioral: Negative.

## 2024-05-28 NOTE — TELEPHONE ENCOUNTER
I received a voicemail from the patient on 5/24 and returned their call today. I left a follow up message regarding their genetic testing sample and sent a [facility] message to the patient. [facility] [place] - [phone]

## 2024-05-29 NOTE — PROGRESS NOTES
Virtual or Telephone Consent    An interactive audio and video telecommunication system which permits real time communications between the patient (at the originating site) and provider (at the distant site) was utilized to provide this telehealth service.   Patient provided consent    Last visit 2/12/24  -sperm banking x3  -start daily cialis 5mg, med counseling done       HPI  53 y.o. M presents today for sperm banking information, referred by Dr. Brooke.     -Dr. Brooke, Dr. Grewal, oncology, and radiology oncology notes reviewed        Proc (10/17/23): TP prostate biopsy   Path: prostatic adenocarcinoma, GG3 ([place] score 4+3=7), 60% of tissue, pattern 4 (30%), GG2 ([place] score 3+4=7), GG1 (Radha score 3+3=6)       53 year old male referred by Dr. Hammer for prostate cancer, dx GG2 ([place] 3+4=7). Hx of elevated PSA. MRI Prostate (5/8/23) showed BPH changes of the TZ, diffuse non nodular hypointensities within the PZ, without evidence of focally restricted diffusion (PI-RADS 2). S/p TP prostate biopsy (10/17/23) with pathology showing prostatic adenocarcinoma, GG3 ([place] score 4+3=7), 60% of tissue, pattern 4 (30%), GG2 ([place] score 3+4=7), GG1 ([place] score 3+3=6). Patient has seen radiation oncology, Dr. Lopez (12/15/23). Patient is moving forward with sperm banking. He is still considering surgery vs radiation.      -has 1 child, 11yo, adopted  -leaning towards prostatectomy  -erections are fine as of now, 9/10        LABS: Personally reviewed and interpreted  Syphillis 4/27: Non-reactive  Hepatitis B 4/27: <3.1, Non-reactive  Hepatitis C 4/27: Non-reactive   HIV1, HIV2 4/27: Non-reactive      Labs  Lab Results   Component Value Date    PSA 5.97 (H) 01/17/2023     Lab Results   Component Value Date    HGBA1C 5.1 04/27/2024     Lab Results   Component Value Date    HCT 38.4 (L) 10/16/2023           PMH:  Past Medical History:   Diagnosis Date    Vision loss         PSH:  Past Surgical History:   Procedure  Laterality Date    NOSE SURGERY      AGE 11        Medications:    Current Outpatient Medications:     cholecalciferol (Vitamin D-3) 25 MCG (1000 UT) tablet, Take 1 tablet (25 mcg) by mouth once daily., Disp: , Rfl:     multivitamin tablet, Take 1 tablet by mouth once daily., Disp: , Rfl:     polyethylene glycol-electrolytes (polyethylene glycol) 420 gram solution, Take by mouth., Disp: , Rfl:     tadalafil (Cialis) 5 mg tablet, Take 1 tablet (5 mg) by mouth once daily., Disp: 30 tablet, Rfl: 11    Allergy:  No Known Allergies     Exam  CONSTITUTIONAL:        No acute distress    HEAD:        Normocephalic and atraumatic    CHEST / RESPIRATORY      no excess work of breathing, no respiratory distress,    ABDOMEN / GASTROINTESTINAL:        Abdomen nondistended          Assessment/Plan  #Fertility Preservation  -sperm banking x3  -Discussed effects of radiation, ejaculation, and fertility  -Still needs GCTT     #ED  -start daily Cialis 5mg when he starts radiation, med counseling done    #Prostate ca: discussed r/b/a of treatment options , lisandro radiation and surgery  -Leaning towards prostatectomy     Fuv after sperm banking    G 2211  Visit complexity inherent to evaluation and management (E&M) associated with medical care services that serve as the continuing focal point for all needed health care services and/or with medical care services that are part of ongoing care related to a patient's single, serious condition or a complex condition.        Scribe Attestation  By signing my name below, Mercedes SHIELDS Scribe, attest that this documentation  has been prepared under the direction and in the presence of Jim Molina MD.

## 2024-05-31 ENCOUNTER — TELEMEDICINE (OUTPATIENT)
Dept: UROLOGY | Facility: CLINIC | Age: 54
End: 2024-05-31
Payer: COMMERCIAL

## 2024-05-31 DIAGNOSIS — Z31.62 ENCOUNTER FOR FERTILITY PRESERVATION COUNSELING: ICD-10-CM

## 2024-05-31 DIAGNOSIS — N52.9 ERECTILE DYSFUNCTION, UNSPECIFIED ERECTILE DYSFUNCTION TYPE: Primary | ICD-10-CM

## 2024-05-31 DIAGNOSIS — C61 PROSTATE CANCER (MULTI): ICD-10-CM

## 2024-05-31 PROCEDURE — 99214 OFFICE O/P EST MOD 30 MIN: CPT | Performed by: UROLOGY

## 2024-05-31 PROCEDURE — G2211 COMPLEX E/M VISIT ADD ON: HCPCS | Performed by: UROLOGY

## 2024-06-08 ENCOUNTER — PATIENT MESSAGE (OUTPATIENT)
Dept: UROLOGY | Facility: CLINIC | Age: 54
End: 2024-06-08
Payer: COMMERCIAL

## 2024-06-08 DIAGNOSIS — Z31.62 ENCOUNTER FOR FERTILITY PRESERVATION COUNSELING PRIOR TO CANCER THERAPY: ICD-10-CM

## 2024-06-10 ENCOUNTER — TELEPHONE (OUTPATIENT)
Dept: UROLOGY | Facility: HOSPITAL | Age: 54
End: 2024-06-10
Payer: COMMERCIAL

## 2024-06-10 NOTE — TELEPHONE ENCOUNTER
Left VM for patient to go over sperm banking instructions. Provided nurse line phone number to call back if needed.

## 2024-06-21 ENCOUNTER — ANCILLARY PROCEDURE (OUTPATIENT)
Dept: ENDOCRINOLOGY | Facility: CLINIC | Age: 54
End: 2024-06-21
Payer: COMMERCIAL

## 2024-06-21 DIAGNOSIS — Z31.62 ENCOUNTER FOR FERTILITY PRESERVATION COUNSELING PRIOR TO CANCER THERAPY: ICD-10-CM

## 2024-06-27 ENCOUNTER — APPOINTMENT (OUTPATIENT)
Dept: ENDOCRINOLOGY | Facility: CLINIC | Age: 54
End: 2024-06-27
Payer: COMMERCIAL

## 2024-06-27 ENCOUNTER — ANCILLARY PROCEDURE (OUTPATIENT)
Dept: ENDOCRINOLOGY | Facility: CLINIC | Age: 54
End: 2024-06-27
Payer: COMMERCIAL

## 2024-06-27 ENCOUNTER — TELEPHONE (OUTPATIENT)
Dept: RADIATION ONCOLOGY | Facility: HOSPITAL | Age: 54
End: 2024-06-27

## 2024-06-27 DIAGNOSIS — Z31.62 ENCOUNTER FOR FERTILITY PRESERVATION COUNSELING PRIOR TO CANCER THERAPY: ICD-10-CM

## 2024-06-27 LAB
# OF VIALS: 2
% EX RESIDUAL CYTOPLASM (SEMEN): 1.3 %
% HEAD DEFECTS (SEMEN): 95 %
% NECK MIDPIECE (SEMEN): 27.3 %
% NORMAL (SEMEN): 4.5 % (ref 4–?)
% TAIL DEFECTS (SEMEN): 3.3 %
ABSTINENCE (DAYS): 5 DAYS (ref 2–7)
AGGLUTINATION (SEMEN): NO
AMOUNT MISSED:: ABNORMAL
ANALYZED TIME:: ABNORMAL
ANDROLOGY LAB ID#: ABNORMAL
CLUMPS (SEMEN): YES
COLLECTED COMPLETELY: NO
COLLECTION LOCATION:: ABNORMAL
COLLECTION METHOD:: ABNORMAL
CONCENTRATION CN (POST-WASH): 33.5 MILL/ML
CONCENTRATION(SEMEN): 81.67 MILL/ML (ref 15–?)
DEBRIS (SEMEN): YES
DEGREE (SEMEN): ABNORMAL
LEUKOCYTE (SEMEN): ABNORMAL
NON PROG. MOTILITY (SEMEN): 4 %
NON PROG. MOTILITY CN (POST-WASH): 7 %
PATTERN (SEMEN): ABNORMAL
PORTION MISSED REI: ABNORMAL
PROG. MOTILITY (SEMEN): 17 % (ref 32–?)
PROG. MOTILITY CN (POST-WASH): 12 %
RECEIVED TIME:: ABNORMAL
REI PARTNER DOB: ABNORMAL
REI PARTNER NAME: ABNORMAL
SEMEN APPEARANCE: NORMAL
SEMEN LIQUEFACTION: NORMAL
SEMEN VISCOSITY: NORMAL
SPECIMEN ID REI: ABNORMAL
SPERM PROCESS METHOD: ABNORMAL
TOT. NO OF NORM. MOTILE SPERM (SEMEN): 0 MILL
TOT. NO OF NORM. SPERM (SEMEN): 0.02 MILL
TOTAL MOTILE SPERM PER VIAL (POST-THAW): 0.6 MILL/VIAL
TOTAL MOTILITY (SEMEN): 20 % (ref 40–?)
TOTAL MOTILITY CN (POST-WASH): 20 %
TOTAL NO OF MOTILE (SEMEN): 0.08 MILL
TOTAL NO OF MOTILE CN (POST-WASH): 2.63 MILL
TOTAL NO OF RND CELLS (SEMEN): ABNORMAL (ref ?–5)
TOTAL NO OF SPERM (SEMEN): 0.41 MILL (ref 39–?)
TOTAL NO OF SPERM CN (POST-WASH): 13.4 MILL
UNIT VOLUME: 0.18
VOLUME (SEMEN): 0.01 ML (ref 1.5–?)
VOLUME CN (POST-WASH): 0.4 ML

## 2024-06-27 PROCEDURE — 89259 CRYOPRESERVATION SPERM: CPT | Performed by: OBSTETRICS & GYNECOLOGY

## 2024-06-28 ENCOUNTER — HOSPITAL ENCOUNTER (OUTPATIENT)
Dept: RADIATION ONCOLOGY | Facility: HOSPITAL | Age: 54
Setting detail: RADIATION/ONCOLOGY SERIES
Discharge: HOME | End: 2024-06-28
Payer: COMMERCIAL

## 2024-06-28 VITALS
HEART RATE: 70 BPM | DIASTOLIC BLOOD PRESSURE: 79 MMHG | OXYGEN SATURATION: 97 % | SYSTOLIC BLOOD PRESSURE: 144 MMHG | BODY MASS INDEX: 28.91 KG/M2 | RESPIRATION RATE: 18 BRPM | WEIGHT: 215.1 LBS | TEMPERATURE: 98.1 F

## 2024-06-28 DIAGNOSIS — C61 PROSTATE CANCER (MULTI): Primary | ICD-10-CM

## 2024-06-28 PROCEDURE — 99213 OFFICE O/P EST LOW 20 MIN: CPT | Performed by: STUDENT IN AN ORGANIZED HEALTH CARE EDUCATION/TRAINING PROGRAM

## 2024-06-28 ASSESSMENT — ENCOUNTER SYMPTOMS
CARDIOVASCULAR NEGATIVE: 1
HEMATOLOGIC/LYMPHATIC NEGATIVE: 1
CONSTITUTIONAL NEGATIVE: 1
GASTROINTESTINAL NEGATIVE: 1
PSYCHIATRIC NEGATIVE: 1
MUSCULOSKELETAL NEGATIVE: 1
RESPIRATORY NEGATIVE: 1
ENDOCRINE NEGATIVE: 1
EYES NEGATIVE: 1
NEUROLOGICAL NEGATIVE: 1

## 2024-06-28 ASSESSMENT — PAIN SCALES - GENERAL: PAINLEVEL: 0-NO PAIN

## 2024-06-28 NOTE — PROGRESS NOTES
Radiation Oncology Nursing Note    IPSS (International Prostate Symptom Score):   0 / 35, bother 0   - He is not experiencing urinary incontinence.      - He is not experiencing dysuria, hematuria, flank pain.     Sexual function:   - Quality of erections during the last 4 weeks: 4 = Firm enough for intercourse  - Use of erectile dysfunction medications:  None    Bowel function:    - Denies hematochezia or pain.     Current Systemic Treatment:  No     Pain: The patient's current pain level was assessed.  They report currently having a pain of 0 out of 10.  They feel their pain is under control without the use of pain medications.    Review of Systems:  Review of Systems   Constitutional: Negative.    HENT:  Negative.     Eyes: Negative.    Respiratory: Negative.     Cardiovascular: Negative.    Gastrointestinal: Negative.    Endocrine: Negative.    Genitourinary: Negative.     Musculoskeletal: Negative.    Skin: Negative.    Neurological: Negative.    Hematological: Negative.    Psychiatric/Behavioral: Negative.

## 2024-06-29 NOTE — PROGRESS NOTES
Staff Physician: Melissa Lopez MD PhD  Date of Service: 2024  Patient name: Judson Smith Jr.   MRN: [id number]    RADIATION ONCOLOGY FOLLOW UP NOTE    IDENTIFYING DATA:  DIAGNOSIS: Newly diagnosed, localized   Cancer Staging   Prostate cancer (Multi)  Staging form: Prostate, AJCC 8th Edition  - Clinical stage from 10/17/2023: Stage IIC (cT1c, cN0, cM0, PSA: 6.7, Grade Group: 3) - Signed by Melissa Lopez MD PhD on 12/15/2023    DISEASE STATE: No prior treatment    Problem List Items Addressed This Visit       Prostate cancer (Multi) - Primary    Relevant Medications    relugolix (Orgovyx) 120 mg tablet    relugolix (Orgovyx) 120 mg tablet     He was last seen in Radiation Oncology on 24 and returns today to discuss radiation treatment.    Oncologic History:  23 MRI prostate w/wo contrast: BPH changes of the TZ. Diffuse non nodular hypointensities within the PZ, without evidence of focally restricted diffusion (PI-RADS 2).     23 PSMA PET: showed diffuse heterogeneous increased tracer uptake of max SUV 11.8 consistent with known neoplasm. No SVI. Negative for estrella or distant metastasis.     10/17/23 TP prostate biopsy:  systematic cores involved with prostatic adenocarcinoma, GG3.      PSA trend:  23 6.71  23 5.97  22 4.47  21 4.2  24 7.9     Interval History:  Decipher 0.41    Today, he is accompanied by his wife and reports feeling well. He has met Dr. Molina and will be undergoing sperm banking in the next couple of weeks. Denies any changes in urinary or bowel function in the past month.     A 10 point review of systems was reviewed with pertinent positives and negatives noted in HPI. All other systems have been reviewed and are negative.    PERFORMANCE STATUS:  Karnofsky Performance Score/ECO, Fully active, able to carry on all pre-disease performed without restriction (ECOG equivalent 0)    PHYSICAL EXAMINATION:  /79   Pulse 70   Temp  36.7 °C (98.1 °F) (Skin)   Resp 18   Wt 97.6 kg (215 lb 1.6 oz)   SpO2 97%   BMI 28.91 kg/m²   Constitutional: well developed, no distress, alert & oriented, cooperative  Eyes: pupils equal round and reactive to light, extraocular movements intact  Respiratory: normal work of breathing    CTCAE (v5) ADVERSE EVENTS:    Toxicity Assessment          6/28/2024    20:00   Toxicity Assessment   Treatment Site Pelvis - male   Diarrhea Grade 0   Hematuria Grade 0   Urinary Incontinence Grade 0   Erectile Dysfunction Grade 0   Urinary Frequency Grade 0   Urinary Retention Grade 0         DIAGNOSTIC REPORTS REVIEWED:  Imaging: All imaging was personally reviewed and interpreted in clinic. Findings as per interval history and EMR.  Laboratory/Pathology:  All pertinent labs and pathology were personally reviewed and interpreted in clinic. Findings as per interval history and EMR.  Lab Results   Component Value Date    PSA 5.97 (H) 01/17/2023    PSA 4.2 (H) 01/12/2021     IMPRESSION:  54 year old man with new unfavorable intermediate risk prostate cancer, (iPSA 6.7, GG3 7/18 cores+), Decipher 0.41, cN0M0 by PSMA PET.     I recommend definitive radiation and short-term ADT. He is ready to proceed with radiation treatment. He will coordinate sperm banking prior to starting RT.  Lastly, he has the consent packet for NRG  (Parallel phase 3 randomized trials of genomic risk-stratified unfavorable intermediate risk prostate cancer: deintensification and intensification clinical trial evaluation), and is interested in enrolling. Given his Decipher score of 0.41, he would be randomized to arm 3 (6m ADT + RT) or arm 4 (6m ADT + darolutamide + RT). However, his PSMA PET scan is >180days old, as such, to enroll, he would need a new scan. He will think about whether or not to pursue this.     Mr. Smith and his wife asked a number of excellent questions that demonstrated good understanding, and would like to proceed with  radiation.    PLAN:  -check testosterone prior to starting ADT  -Orgovyx prescribed  -research RN will reach out regarding  next week  -sperm banking  -coordinate spacer + fiducial placement with Dr. Brooke, followed by  CT-sim at [facility]   -plan for 5fx radiation, treat at [facility], plan to start ADT concurrent with RT    He knows to call with any questions or concerns in the interim.    Melissa Lopez MD PhD  , Radiation Oncology

## 2024-07-01 ENCOUNTER — TELEPHONE (OUTPATIENT)
Dept: HEMATOLOGY/ONCOLOGY | Facility: HOSPITAL | Age: 54
End: 2024-07-01
Payer: COMMERCIAL

## 2024-07-01 ENCOUNTER — SPECIALTY PHARMACY (OUTPATIENT)
Dept: PHARMACY | Facility: CLINIC | Age: 54
End: 2024-07-01

## 2024-07-02 ENCOUNTER — APPOINTMENT (OUTPATIENT)
Dept: UROLOGY | Facility: CLINIC | Age: 54
End: 2024-07-02
Payer: COMMERCIAL

## 2024-07-02 VITALS — BODY MASS INDEX: 27.72 KG/M2 | WEIGHT: 216 LBS | HEIGHT: 74 IN

## 2024-07-02 DIAGNOSIS — N52.8 OTHER MALE ERECTILE DYSFUNCTION: ICD-10-CM

## 2024-07-02 DIAGNOSIS — R39.9 LOWER URINARY TRACT SYMPTOMS (LUTS): ICD-10-CM

## 2024-07-02 PROCEDURE — 99213 OFFICE O/P EST LOW 20 MIN: CPT | Performed by: UROLOGY

## 2024-07-02 PROCEDURE — G2211 COMPLEX E/M VISIT ADD ON: HCPCS | Performed by: UROLOGY

## 2024-07-02 ASSESSMENT — PAIN SCALES - GENERAL: PAINLEVEL: 0-NO PAIN

## 2024-07-02 NOTE — PROGRESS NOTES
"HPI  54 y.o. M presents today for sperm banking information, referred by Dr. Brooke.     Proc (10/17/23): TP prostate biopsy   Path: prostatic adenocarcinoma, GG3 (Widener score 4+3=7), 60% of tissue, pattern 4 (30%), GG2 (Radha score 3+4=7), GG1 (Widener score 3+3=6)       53 year old male referred by Dr. Hammer for prostate cancer, dx GG2 (Radha 3+4=7). Hx of elevated PSA. MRI Prostate (5/8/23) showed BPH changes of the TZ, diffuse non nodular hypointensities within the PZ, without evidence of focally restricted diffusion (PI-RADS 2). S/p TP prostate biopsy (10/17/23) with pathology showing prostatic adenocarcinoma, GG3 (Radha score 4+3=7), 60% of tissue, pattern 4 (30%), GG2 (Radha score 3+4=7), GG1 (Widener score 3+3=6). Patient has seen radiation oncology, Dr. Lopez (12/15/23). Patient is moving forward with sperm banking. He is still considering surgery vs radiation.     Last visit 2/12/24  -sperm banking x3  -start daily cialis 5mg, med counseling done     Todays Visit:  #Fertility Preservation   -1/3 sperm banking orders completed - reviewed with patient  -\"needed help' to do sample  -picked up cialis and didn't start    -has 1 child, 11yo, adopted  -leaning towards prostatectomy  -erections are fine as of now, 9/10    LABS: Personally reviewed and interpreted  Syphillis 4/27: Non-reactive  Hepatitis B 4/27: <3.1, Non-reactive  Hepatitis C 4/27: Non-reactive   HIV1, HIV2 4/27: Non-reactive    Component      Latest Ref Rng 6/27/2024   Volume (Semen)      1.5 mL 0.005 ! (P)   Concentration(Semen)      15 mill/mL 81.67 (P)   Total Motility (Semen)      40 % 20 ! (P)   Prog. Motility (Semen)      32 % 17 ! (P)   Non Prog. Motility (Semen)      % 4 (P)   Total No of Sperm (Semen)      39 mill 0.41 ! (P)   Total No of Motile (Semen)      mill 0.08 (P)   VOLUME CN (Post-Wash)      mL 0.40 (P)   CONCENTRATION CN (Post-Wash)      mill/mL 33.50 (P)   TOTAL MOTILITY CN (Post-Wash)      % 20 (P)   PROG. MOTILITY CN " (Post-Wash)      % 12 (P)   NON PROG. MOTILITY CN (Post-Wash)      % 7 (P)   TOTAL NO OF SPERM CN (Post-Wash)      mill 13.40 (P)   TOTAL NO OF MOTILE CN (Post-wash)      mill 2.63 (P)   % Normal (Semen)      4 % 4.5 (P)   % Head defects (Semen)      % 95.0 (P)   % Neck Midpiece (Semen)      % 27.3 (P)   % Tail defects (Semen)      % 3.3 (P)   % Ex Residual Cytoplasm (Semen)      % 1.3 (P)   Tot. No of Norm. Sperm (Semen)      mill 0.018 (P)   Tot. No of Norm. Motile Sperm (Semen)      mill 0.004 (P)        Labs  Lab Results   Component Value Date    PSA 5.97 (H) 01/17/2023    PSA 4.2 (H) 01/12/2021         PMH:  Past Medical History:   Diagnosis Date    Vision loss         PSH:  Past Surgical History:   Procedure Laterality Date    NOSE SURGERY      AGE 11        Medications:    Current Outpatient Medications:     cholecalciferol (Vitamin D-3) 25 MCG (1000 UT) tablet, Take 1 tablet (25 mcg) by mouth once daily., Disp: , Rfl:     multivitamin tablet, Take 1 tablet by mouth once daily., Disp: , Rfl:     polyethylene glycol-electrolytes (polyethylene glycol) 420 gram solution, Take by mouth., Disp: , Rfl:     relugolix (Orgovyx) 120 mg tablet, Take 1 tablet (120 mg total) by mouth once daily.  If missed more than 7 days in a row, take 3 tabs before going back to 1 tab per day, Disp: 30 tablet, Rfl: 4    relugolix (Orgovyx) 120 mg tablet, Take 1 tablet (120 mg total) by mouth once daily.  120mg qd (take 3 tablets on day 1, then 1 tablet a day onwards daily). If missed more than 7 days in a row, take 3 tabs before going back to 1 tab per day, Disp: 30 tablet, Rfl: 0    tadalafil (Cialis) 5 mg tablet, Take 1 tablet (5 mg) by mouth once daily. (Patient not taking: Reported on 6/28/2024), Disp: 30 tablet, Rfl: 11    Allergy:  No Known Allergies     Exam  CONSTITUTIONAL:        No acute distress    HEAD:        Normocephalic and atraumatic    CHEST / RESPIRATORY      no excess work of breathing, no respiratory distress,     ABDOMEN / GASTROINTESTINAL:        Abdomen nondistended      Assessment/Plan  #Fertility Preservation: severe oligo on 1 sample, had trouble giving sample  -Sperm banking x3 - discussed doing another 2 samples, ideally in lab to ensure better collection  -if no success discussed tese, possibly at same time as spacer placement.   -Discussed effects of radiation, ejaculation, and fertility     #ED  -start daily Cialis 5mg when he starts radiation, med counseling done    #Prostate ca: discussed r/b/a of treatment options , lisandro radiation and surgery  -Leaning towards radiation, scheduled     Fuv after sperm banking    G 2211  Visit complexity inherent to evaluation and management (E&M) associated with medical care services that serve as the continuing focal point for all needed health care services and/or with medical care services that are part of ongoing care related to a patient's single, serious condition or a complex condition.    Scribe Attestation  By signing my name below, I, Savanna Silva, Scribe, attest that this documentation  has been prepared under the direction and in the presence of Jim Molina MD.

## 2024-07-05 ENCOUNTER — LAB (OUTPATIENT)
Dept: LAB | Facility: LAB | Age: 54
End: 2024-07-05
Payer: COMMERCIAL

## 2024-07-05 ENCOUNTER — ANCILLARY PROCEDURE (OUTPATIENT)
Dept: ENDOCRINOLOGY | Facility: CLINIC | Age: 54
End: 2024-07-05
Payer: COMMERCIAL

## 2024-07-05 DIAGNOSIS — Z31.62 ENCOUNTER FOR FERTILITY PRESERVATION COUNSELING PRIOR TO CANCER THERAPY: ICD-10-CM

## 2024-07-05 DIAGNOSIS — Z11.3 SCREENING EXAMINATION FOR STD (SEXUALLY TRANSMITTED DISEASE): ICD-10-CM

## 2024-07-05 LAB
AP SUMMARY REPORT: NORMAL
HBV SURFACE AB SER-ACNC: <3.1 MIU/ML
HBV SURFACE AG SERPL QL IA: NONREACTIVE
HCV AB SER QL: NONREACTIVE
HIV 1+2 AB+HIV1 P24 AG SERPL QL IA: NONREACTIVE
SCAN RESULT: NORMAL
TREPONEMA PALLIDUM IGG+IGM AB [PRESENCE] IN SERUM OR PLASMA BY IMMUNOASSAY: NONREACTIVE

## 2024-07-05 PROCEDURE — 87389 HIV-1 AG W/HIV-1&-2 AB AG IA: CPT

## 2024-07-05 PROCEDURE — 87491 CHLMYD TRACH DNA AMP PROBE: CPT

## 2024-07-05 PROCEDURE — 86706 HEP B SURFACE ANTIBODY: CPT

## 2024-07-05 PROCEDURE — 87591 N.GONORRHOEAE DNA AMP PROB: CPT

## 2024-07-05 PROCEDURE — 36415 COLL VENOUS BLD VENIPUNCTURE: CPT

## 2024-07-05 PROCEDURE — 86803 HEPATITIS C AB TEST: CPT

## 2024-07-05 PROCEDURE — 86780 TREPONEMA PALLIDUM: CPT

## 2024-07-05 PROCEDURE — 87340 HEPATITIS B SURFACE AG IA: CPT

## 2024-07-06 LAB
C TRACH RRNA SPEC QL NAA+PROBE: NEGATIVE
N GONORRHOEA DNA SPEC QL PROBE+SIG AMP: NEGATIVE

## 2024-07-09 NOTE — PROGRESS NOTES
Vm left to get patient scheduled for spaceOAR per Dr. Lopez recommendations. Waiting on call back.

## 2024-07-11 ENCOUNTER — ANCILLARY PROCEDURE (OUTPATIENT)
Dept: ENDOCRINOLOGY | Facility: CLINIC | Age: 54
End: 2024-07-11
Payer: COMMERCIAL

## 2024-07-11 ENCOUNTER — APPOINTMENT (OUTPATIENT)
Dept: ENDOCRINOLOGY | Facility: CLINIC | Age: 54
End: 2024-07-11
Payer: COMMERCIAL

## 2024-07-11 ENCOUNTER — SPECIALTY PHARMACY (OUTPATIENT)
Dept: PHARMACY | Facility: CLINIC | Age: 54
End: 2024-07-11

## 2024-07-11 DIAGNOSIS — Z31.62 ENCOUNTER FOR FERTILITY PRESERVATION COUNSELING: ICD-10-CM

## 2024-07-11 LAB
# OF VIALS: 11
ABSTINENCE (DAYS): 7 DAYS (ref 2–7)
AGGLUTINATION (SEMEN): NO
ANALYZED TIME:: NORMAL
ANDROLOGY LAB ID#: NORMAL
CLUMPS (SEMEN): YES
COLLECTED COMPLETELY: YES
COLLECTION LOCATION:: NORMAL
COLLECTION METHOD:: NORMAL
CONCENTRATION CN (POST-WASH): 100 MILL/ML
CONCENTRATION(SEMEN): 166.67 MILL/ML (ref 15–?)
DEBRIS (SEMEN): YES
NON PROG. MOTILITY (SEMEN): 2 %
NON PROG. MOTILITY CN (POST-WASH): 3 %
PROG. MOTILITY (SEMEN): 49 % (ref 32–?)
PROG. MOTILITY CN (POST-WASH): 38 %
RECEIVED TIME:: NORMAL
SEMEN APPEARANCE: NORMAL
SEMEN LIQUEFACTION: NORMAL
SEMEN VISCOSITY: NORMAL
SPECIMEN ID REI: NORMAL
TOTAL MOTILE SPERM PER VIAL (POST-THAW): 8 MILL/VIAL
TOTAL MOTILITY (SEMEN): 51 % (ref 40–?)
TOTAL MOTILITY CN (POST-WASH): 41 %
TOTAL NO OF MOTILE (SEMEN): 187 MILL
TOTAL NO OF MOTILE CN (POST-WASH): 178.2 MILL
TOTAL NO OF SPERM (SEMEN): 366.67 MILL (ref 39–?)
TOTAL NO OF SPERM CN (POST-WASH): 440 MILL
UNIT VOLUME: 0.4
VOLUME (SEMEN): 2.2 ML (ref 1.5–?)
VOLUME CN (POST-WASH): 4.4 ML

## 2024-07-11 PROCEDURE — 89259 CRYOPRESERVATION SPERM: CPT | Performed by: OBSTETRICS & GYNECOLOGY

## 2024-07-12 ENCOUNTER — PHARMACY VISIT (OUTPATIENT)
Dept: PHARMACY | Facility: CLINIC | Age: 54
End: 2024-07-12
Payer: COMMERCIAL

## 2024-07-12 PROCEDURE — RXMED WILLOW AMBULATORY MEDICATION CHARGE

## 2024-07-17 ENCOUNTER — SPECIALTY PHARMACY (OUTPATIENT)
Dept: PHARMACY | Facility: CLINIC | Age: 54
End: 2024-07-17

## 2024-07-17 NOTE — PROGRESS NOTES
[facility] Clinical Note    Judson Smith Jr. is a 54 y.o. male, who is on the specialty pharmacy service for management of:  Oncology Core.    Judson Smith Jr. is taking: Orgovyx.    Medication Receipt Date: 7/15/24  Medication Start Date (planned or actual): 7/18/24    Judson was contacted on 7/17/2024 at 9:06 AM for a virtual pharmacy visit with encounter number [id number] from:   [facility]  [address] [place] [id number]  Dept: [phone]  Dept Fax: [phone]    Judson was offered a Telemedicine Video visit or Telephone visit.  Judson consented to a Telephone visit, which was performed.    The most recent encounter visit with the referring prescriber Melissa Lopez on 6/28/24 was reviewed.  Pharmacy will continue to collaborate in the care of this patient with the referring prescriber Dr. Lopez.    General Assessment      Impression/Plan  IMPRESSION/PLAN:  Is patient high risk (potential patients:  pregnancy, geriatric, pediatric)? No   Is laboratory follow-up needed? No  Is a clinical intervention needed? No  Next reassessment date? 14 days  Additional comments:     Refer to the encounter summary report for documentation details about patient counseling and education.      Medication Adherence    The importance of adherence was discussed with the patient and they were advised to take the medication as prescribed by their provider. Patient was encouraged to call their physician's office if they have a question regarding a missed dose.        Patient was advised to contact the pharmacy if there are any changes to their medication list, including prescriptions, OTC medications, herbal products, or supplements. Patient was advised of [facility]'s dispensing process, refill timeline, contact information ([phone]), and patient management follow up. Patient confirmed understanding of education  conducted during assessment. All patient questions and concerns were addressed to the best of my ability. Patient was encouraged to contact the specialty pharmacy with any questions or concerns.    Confirmed follow-up outreaches are properly scheduled and reviewed goals of therapy with the patient.        Damaris A Weiland, MukeshD

## 2024-07-19 ENCOUNTER — ANCILLARY PROCEDURE (OUTPATIENT)
Dept: ENDOCRINOLOGY | Facility: CLINIC | Age: 54
End: 2024-07-19
Payer: COMMERCIAL

## 2024-07-19 DIAGNOSIS — Z31.62 ENCOUNTER FOR FERTILITY PRESERVATION COUNSELING: ICD-10-CM

## 2024-07-19 LAB
# OF VIALS: 8
# OF VIALS: 8
ABSTINENCE (DAYS): 7 DAYS (ref 2–7)
ABSTINENCE (DAYS): 7 DAYS (ref 2–7)
AGGLUTINATION (SEMEN): NO
AMOUNT MISSED:: ABNORMAL
AMOUNT MISSED:: ABNORMAL
ANALYZED TIME:: ABNORMAL
ANALYZED TIME:: ABNORMAL
ANDROLOGY LAB ID#: ABNORMAL
CLUMPS (SEMEN): YES
COLLECTED COMPLETELY: NO
COLLECTED COMPLETELY: NO
COLLECTION LOCATION:: ABNORMAL
COLLECTION LOCATION:: ABNORMAL
COLLECTION METHOD:: ABNORMAL
COLLECTION METHOD:: ABNORMAL
CONCENTRATION CN (POST-WASH): 86.89 MILL/ML
CONCENTRATION(SEMEN): 183.18 MILL/ML (ref 15–?)
DEBRIS (SEMEN): YES
NON PROG. MOTILITY (SEMEN): 1 %
NON PROG. MOTILITY CN (POST-WASH): 2 %
PORTION MISSED REI: ABNORMAL
PORTION MISSED REI: ABNORMAL
PROG. MOTILITY (SEMEN): 34 % (ref 32–?)
PROG. MOTILITY CN (POST-WASH): 32 %
RECEIVED TIME:: ABNORMAL
RECEIVED TIME:: ABNORMAL
REI PARTNER DOB: ABNORMAL
REI PARTNER NAME: ABNORMAL
SEMEN APPEARANCE: NORMAL
SEMEN LIQUEFACTION: NORMAL
SEMEN VISCOSITY: NORMAL
SPECIMEN ID REI: ABNORMAL
SPECIMEN ID REI: ABNORMAL
TOTAL MOTILE SPERM PER VIAL (POST-THAW): 5.21 MILL/VIAL
TOTAL MOTILE SPERM PER VIAL (POST-THAW): 5.21 MILL/VIAL
TOTAL MOTILITY (SEMEN): 35 % (ref 40–?)
TOTAL MOTILITY CN (POST-WASH): 35 %
TOTAL NO OF MOTILE (SEMEN): 161.56 MILL
TOTAL NO OF MOTILE CN (POST-WASH): 152.25 MILL
TOTAL NO OF SPERM (SEMEN): 461.62 MILL (ref 39–?)
TOTAL NO OF SPERM CN (POST-WASH): 437.92 MILL
UNIT VOLUME: ABNORMAL
UNIT VOLUME: ABNORMAL
VOLUME (SEMEN): 2.52 ML (ref 1.5–?)
VOLUME CN (POST-WASH): 5.04 ML

## 2024-07-19 PROCEDURE — 89259 CRYOPRESERVATION SPERM: CPT | Performed by: OBSTETRICS & GYNECOLOGY

## 2024-07-23 ENCOUNTER — TELEPHONE (OUTPATIENT)
Dept: UROLOGY | Facility: CLINIC | Age: 54
End: 2024-07-23

## 2024-07-23 NOTE — TELEPHONE ENCOUNTER
Patient called and left a message about scheduling surgery. This nurse called back with no answer, left voicemail with telephone number to give the office an call back.

## 2024-07-29 DIAGNOSIS — C61 PROSTATE CANCER (MULTI): ICD-10-CM

## 2024-07-29 RX ORDER — SODIUM CHLORIDE, SODIUM LACTATE, POTASSIUM CHLORIDE, CALCIUM CHLORIDE 600; 310; 30; 20 MG/100ML; MG/100ML; MG/100ML; MG/100ML
100 INJECTION, SOLUTION INTRAVENOUS CONTINUOUS
OUTPATIENT
Start: 2024-07-29

## 2024-07-29 RX ORDER — CEFAZOLIN SODIUM 2 G/100ML
2 INJECTION, SOLUTION INTRAVENOUS ONCE
OUTPATIENT
Start: 2024-07-29 | End: 2024-07-29

## 2024-07-29 NOTE — PROGRESS NOTES
Spoke with patient about scheduling upcoming procedure with Dr. Fregoso. Patient agreed to 9/3/24 for spaceOAR and fiducial placement. Informational packet to be mailed to patient.

## 2024-08-09 ENCOUNTER — SPECIALTY PHARMACY (OUTPATIENT)
Dept: PHARMACY | Facility: CLINIC | Age: 54
End: 2024-08-09

## 2024-08-12 ENCOUNTER — TELEPHONE (OUTPATIENT)
Dept: RADIATION ONCOLOGY | Facility: HOSPITAL | Age: 54
End: 2024-08-12
Payer: COMMERCIAL

## 2024-08-12 NOTE — TELEPHONE ENCOUNTER
Called patient to clarify orgovyx usage. Reviewed again decipher score of 0.41. Indicating low at this time. Based on plan discussion with Dr. Lopez in prior conversation plan is for 6m of orgovyx. Reviewed briefly SE profile of medication. Discussed proper usage and when to take. Plan to start 08/13/2024.

## 2024-08-21 ENCOUNTER — LAB (OUTPATIENT)
Dept: LAB | Facility: LAB | Age: 54
End: 2024-08-21
Payer: COMMERCIAL

## 2024-08-21 ENCOUNTER — PRE-ADMISSION TESTING (OUTPATIENT)
Dept: PREADMISSION TESTING | Facility: HOSPITAL | Age: 54
End: 2024-08-21
Payer: COMMERCIAL

## 2024-08-21 VITALS
TEMPERATURE: 97.7 F | RESPIRATION RATE: 14 BRPM | BODY MASS INDEX: 27.78 KG/M2 | HEIGHT: 74 IN | OXYGEN SATURATION: 99 % | HEART RATE: 59 BPM | DIASTOLIC BLOOD PRESSURE: 74 MMHG | WEIGHT: 216.49 LBS | SYSTOLIC BLOOD PRESSURE: 117 MMHG

## 2024-08-21 DIAGNOSIS — Z01.818 PREOP TESTING: ICD-10-CM

## 2024-08-21 DIAGNOSIS — Z01.818 PREOP TESTING: Primary | ICD-10-CM

## 2024-08-21 DIAGNOSIS — C61 PROSTATE CANCER (MULTI): ICD-10-CM

## 2024-08-21 LAB
ALBUMIN SERPL BCP-MCNC: 4.7 G/DL (ref 3.4–5)
ALP SERPL-CCNC: 47 U/L (ref 33–120)
ALT SERPL W P-5'-P-CCNC: 24 U/L (ref 10–52)
ANION GAP SERPL CALC-SCNC: 13 MMOL/L (ref 10–20)
AST SERPL W P-5'-P-CCNC: 17 U/L (ref 9–39)
BILIRUB SERPL-MCNC: 0.9 MG/DL (ref 0–1.2)
BUN SERPL-MCNC: 19 MG/DL (ref 6–23)
CALCIUM SERPL-MCNC: 9.7 MG/DL (ref 8.6–10.3)
CHLORIDE SERPL-SCNC: 103 MMOL/L (ref 98–107)
CO2 SERPL-SCNC: 27 MMOL/L (ref 21–32)
CREAT SERPL-MCNC: 1.5 MG/DL (ref 0.5–1.3)
EGFRCR SERPLBLD CKD-EPI 2021: 55 ML/MIN/1.73M*2
ERYTHROCYTE [DISTWIDTH] IN BLOOD BY AUTOMATED COUNT: 12.3 % (ref 11.5–14.5)
GLUCOSE SERPL-MCNC: 75 MG/DL (ref 74–99)
HCT VFR BLD AUTO: 37 % (ref 41–52)
HGB BLD-MCNC: 11.9 G/DL (ref 13.5–17.5)
MCH RBC QN AUTO: 31 PG (ref 26–34)
MCHC RBC AUTO-ENTMCNC: 32.2 G/DL (ref 32–36)
MCV RBC AUTO: 96 FL (ref 80–100)
NRBC BLD-RTO: ABNORMAL /100{WBCS}
PLATELET # BLD AUTO: 230 X10*3/UL (ref 150–450)
POTASSIUM SERPL-SCNC: 4.5 MMOL/L (ref 3.5–5.3)
PROT SERPL-MCNC: 7.2 G/DL (ref 6.4–8.2)
RBC # BLD AUTO: 3.84 X10*6/UL (ref 4.5–5.9)
SODIUM SERPL-SCNC: 138 MMOL/L (ref 136–145)
WBC # BLD AUTO: 3.3 X10*3/UL (ref 4.4–11.3)

## 2024-08-21 PROCEDURE — 36415 COLL VENOUS BLD VENIPUNCTURE: CPT

## 2024-08-21 PROCEDURE — 93005 ELECTROCARDIOGRAM TRACING: CPT

## 2024-08-21 PROCEDURE — 93010 ELECTROCARDIOGRAM REPORT: CPT | Performed by: INTERNAL MEDICINE

## 2024-08-21 PROCEDURE — 80053 COMPREHEN METABOLIC PANEL: CPT

## 2024-08-21 PROCEDURE — 99203 OFFICE O/P NEW LOW 30 MIN: CPT

## 2024-08-21 PROCEDURE — 85027 COMPLETE CBC AUTOMATED: CPT

## 2024-08-21 ASSESSMENT — DUKE ACTIVITY SCORE INDEX (DASI)
CAN YOU DO YARD WORK LIKE RAKING LEAVES, WEEDING OR PUSHING A MOWER: YES
CAN YOU WALK INDOORS, SUCH AS AROUND YOUR HOUSE: YES
CAN YOU PARTICIPATE IN STRENOUS SPORTS LIKE SWIMMING, SINGLES TENNIS, FOOTBALL, BASKETBALL, OR SKIING: YES
CAN YOU DO LIGHT WORK AROUND THE HOUSE LIKE DUSTING OR WASHING DISHES: YES
CAN YOU CLIMB A FLIGHT OF STAIRS OR WALK UP A HILL: YES
CAN YOU WALK A BLOCK OR TWO ON LEVEL GROUND: YES
TOTAL_SCORE: 58.2
DASI METS SCORE: 9.9
CAN YOU DO HEAVY WORK AROUND THE HOUSE LIKE SCRUBBING FLOORS OR LIFTING AND MOVING HEAVY FURNITURE: YES
CAN YOU RUN A SHORT DISTANCE: YES
CAN YOU PARTICIPATE IN MODERATE RECREATIONAL ACTIVITIES LIKE GOLF, BOWLING, DANCING, DOUBLES TENNIS OR THROWING A BASEBALL OR FOOTBALL: YES
CAN YOU TAKE CARE OF YOURSELF (EAT, DRESS, BATHE, OR USE TOILET): YES
CAN YOU DO MODERATE WORK AROUND THE HOUSE LIKE VACUUMING, SWEEPING FLOORS OR CARRYING GROCERIES: YES
CAN YOU HAVE SEXUAL RELATIONS: YES

## 2024-08-21 ASSESSMENT — PAIN - FUNCTIONAL ASSESSMENT: PAIN_FUNCTIONAL_ASSESSMENT: 0-10

## 2024-08-21 ASSESSMENT — PAIN SCALES - GENERAL: PAINLEVEL_OUTOF10: 3

## 2024-08-21 NOTE — CPM/PAT H&P
CPM/PAT Evaluation       Name: Judson Smith Jr. (Judson Smith JrChester)  /Age: 1970/54 y.o.     In-Person       Chief Complaint: Prostate cancer    HPI  Patient is an alert and oriented 54 year old male scheduled for a fiducial marker placement of prostate on 9/10/2024 with Dr. Brooke for  prostate cancer. He does not endorse any urinary symptoms at this time. PMHX includes prostate cancer. Presents to [facility] [place] [place] PAT today for perioperative risk stratification and optimization.     Past Medical History:   Diagnosis Date    Prostate cancer (Multi)     Vision loss      Past Surgical History:   Procedure Laterality Date    NOSE SURGERY      AGE 11    PROSTATE BIOPSY       Patient Sexual activity questions deferred to the physician.    Family History   Problem Relation Name Age of Onset    COPD Mother SOY     Other (VASOVAGAL) Mother SOY     Atrial fibrillation Mother SOY     Cancer Father JUDSON LEAL     Prostate cancer Father JUDSON LEAL      No Known Allergies    Prior to Admission medications    Medication Sig Start Date End Date Taking? Authorizing Provider   cholecalciferol (Vitamin D-3) 25 MCG (1000 UT) tablet Take 1 tablet (25 mcg) by mouth once daily. 23  Yes Historical Provider, MD   multivitamin tablet Take 1 tablet by mouth once daily.   Yes Historical Provider, MD   relugolix (Orgovyx) 120 mg tablet Take 1 tablet (120 mg total) by mouth once daily.  If missed more than 7 days in a row, take 3 tabs before going back to 1 tab per day 24 Yes Melissa Lopez MD PhD   tadalafil (Cialis) 5 mg tablet Take 1 tablet (5 mg) by mouth once daily. 24 Yes Jim Molina MD   polyethylene glycol-electrolytes (polyethylene glycol) 420 gram solution Take by mouth. 21  Historical Provider, MD       Review of Systems   Constitutional: Negative for chills, decreased appetite, diaphoresis, fever and malaise/fatigue.   Eyes:  Negative for blurred vision and  double vision.   Cardiovascular:  Negative for chest pain, claudication, cyanosis, dyspnea on exertion, irregular heartbeat, leg swelling, near-syncope and palpitations.   Respiratory:  Negative for cough, hemoptysis, shortness of breath and wheezing.    Endocrine: Negative for cold intolerance, heat intolerance, polydipsia, polyphagia and polyuria.   Gastrointestinal:  Negative for abdominal pain, constipation, diarrhea, dysphagia, nausea and vomiting.   Genitourinary:  Negative for bladder incontinence, dysuria, hematuria, incomplete emptying, nocturia, frequency, pelvic pain and urgency.   Neurological:  Negative for headaches, light-headedness, paresthesias, sensory change and weakness.   Psychiatric/Behavioral:  Negative for altered mental status.    Musculoskeletal: Negative for myalgias, arthralgias     Vitals and nursing note reviewed.     Physical exam  Constitutional:       Appearance: Normal appearance. He is Overweight.   HENT:      Head: Normocephalic and atraumatic.      Mouth/Throat:      Mouth: Mucous membranes are moist.      Pharynx: Oropharynx is clear.   Eyes:      Extraocular Movements: Extraocular movements intact.      Conjunctiva/sclera: Conjunctivae normal.      Pupils: Pupils are equal, round, and reactive to light.   Cardiovascular:      PMI at left midclavicular line. Normal rate. Regular rhythm. Normal S1. Normal S2.       Murmurs: There is no murmur.      No gallop.  No click. No rub.       No audible carotid bruit     No lower extremity edema on exam  Pulmonary:      Effort: Pulmonary effort is normal.      Breath sounds: Normal breath sounds.   Abdominal:      General: Abdomen is flat. Bowel sounds are normal.      Palpations: Abdomen is soft and non-tender  Musculoskeletal:      Cervical back: Normal range of motion and neck supple.   Skin:     General: Skin is warm and dry.      Capillary Refill: Capillary refill takes less than 2 seconds.   Neurological:      General: No focal  "deficit present.      Mental Status: He is alert and oriented to person, place, and time. Mental status is at baseline.   Psychiatric:         Mood and Affect: Mood normal.         Behavior: Behavior normal.         Thought Content: Thought content normal.         Judgment: Judgment normal.     Vitals and nursing note reviewed. Physical exam within normal limits.     Visit Vitals  /74   Pulse 59   Temp 36.5 °C (97.7 °F) (Temporal)   Resp 14   Ht 1.88 m (6' 2\")   Wt 98.2 kg (216 lb 7.9 oz)   SpO2 99%   BMI 27.80 kg/m²   Smoking Status Some Days   BSA 2.26 m²      DASI Risk Score      Flowsheet Row Most Recent Value   DASI SCORE 58.2   METS Score (Will be calculated only when all the questions are answered) 9.9          Caprini DVT Assessment      Flowsheet Row Most Recent Value   DVT Score 5   Current Status Minor surgery planned, Present cancer or chemotherapy   Age 40-59 years   BMI 30 or less          Modified Frailty Index      Flowsheet Row Most Recent Value   Modified Frailty Index Calculator 0          CHADS2 Stroke Risk         N/A 3 to 100%: High Risk   2 to < 3%: Medium Risk   0 to < 2%: Low Risk     Last Change: N/A          This score determines the patient's risk of having a stroke if the patient has atrial fibrillation.        This score is not applicable to this patient. Components are not calculated.          Revised Cardiac Risk Index      Flowsheet Row Most Recent Value   Revised Cardiac Risk Calculator 0          Apfel Simplified Score    No data to display       Risk Analysis Index Results This Encounter    No data found in the last 1 encounters.       Stop Bang Score      Flowsheet Row Most Recent Value   Do you snore loudly? 0   Do you often feel tired or fatigued after your sleep? 0   Has anyone ever observed you stop breathing in your sleep? 0   Do you have or are you being treated for high blood pressure? 0   Recent BMI (Calculated) 27.8   Is BMI greater than 35 kg/m2? 0=No   Age older " than 50 years old? 1=Yes   Is your neck circumference greater than 17 inches (Male) or 16 inches (Female)? 0   Gender - Male 1=Yes   STOP-BANG Total Score 2          Assessment & Plan:    Neuro:  No diagnosis or significant findings on chart review or clinical presentation and evaluation.     HEENT/Airway:  No diagnosis or significant findings on chart review or clinical presentation and evaluation.   STOP-BANG Score-2  points low risk for TRUNG    Mallampati::  II    TM distance::  >3 FB    Neck ROM::  Full  Dentures-denies  Crowns-denies  Implants-denies  Missing teeth in upper front noted    Cardiovascular:  No diagnosis or significant findings on chart review or clinical presentation and evaluation.   METS: 9.9  RCRI: 0 points, 3.9%  risk for postoperative MACE   AMANUEL: 0.1% risk for postoperative MACE  EKG -sinus bradycardia, 1st degree AV block Rate-57  No acute changes    Pulmonary:  No diagnosis or significant findings on chart review or clinical presentation and evaluation.   ARISCAT: <26 points, 1.6% risk of in-hospital postoperative pulmonary complication  PRODIGY: Moderate risk for opioid induced respiratory depression  Smoking History-denies  Discussed smoking cessation and deep breathing handout given    Renal:  No diagnosis or significant findings on chart review or clinical presentation and evaluation  CMP-Reviewed, stable  Creatinine-1.50  GFR-55    Endocrine:  No diagnosis or significant findings on chart review or clinical presentation and evaluation.     Hematologic/Immunology:  No significant findings on chart review or clinical presentation and evaluation.  History of Prostate cancer-Managed with Relugolix  CBC-Reviewed, stable  Positive for Anemia-HGB 11.9 normocytic, normochromic  Positive for leukopenia-WBC 3.3  Caprini Score 5, patient at High for postoperative DVT. Pt supplied education/VTE handout  Anticoagulation use: No    Gastrointestinal:   No diagnosis or significant findings on chart  review or clinical presentation and evaluation.   Alcohol use-reports occasiona ETOH  Drug use-denies    Infectious disease:   No diagnosis or significant findings on chart review or clinical presentation and evaluation.     Musculoskeletal:   No diagnosis or significant findings on chart review or clinical presentation and evaluation.   JHFRAT score-0 points. low risk for falls    Anesthesia:  No anesthesia complications  No family history of anesthesia complications    Abnormalities noted on PAT evaluation: None    Labs & Imaging ordered:  CBC, CMP, EKG  Nickel/metal allergy-negative  Shellfish allergy-negative    Overall, patient Low Risk for the scheduled Low Risk surgery. Discussed with patient medication instructions, NPO guidelines, and any questions or concerns.     Face to face time-30 minutes

## 2024-08-21 NOTE — PREPROCEDURE INSTRUCTIONS
Medication List            Accurate as of August 21, 2024  2:43 PM. Always use your most recent med list.                cholecalciferol 25 MCG (1000 UT) tablet  Commonly known as: Vitamin D-3  Medication Adjustments for Surgery: Stop 7 days before surgery  Notes to patient: Last dose preoperatively 9/2/2024     multivitamin tablet  Medication Adjustments for Surgery: Stop 7 days before surgery  Notes to patient: Last dose preoperatively 9/2/2024     relugolix 120 mg tablet  Commonly known as: Orgovyx  Take 1 tablet (120 mg total) by mouth once daily.  If missed more than 7 days in a row, take 3 tabs before going back to 1 tab per day  Medication Adjustments for Surgery: Take morning of surgery with sip of water, no other fluids     tadalafil 5 mg tablet  Commonly known as: Cialis  Take 1 tablet (5 mg) by mouth once daily.  Medication Adjustments for Surgery: Stop 3 days before surgery  Notes to patient: Last dose preoperatively 9/6/2024            NPO Instructions:     Do not eat any food after midnight the night before your surgery/procedure.  You may have clear liquids until TWO hours before surgery/procedure. This includes water, black tea/coffee, (no milk or cream) apple juice and electrolyte drinks (Gatorade).  You may chew gum up to TWO hours before your surgery/procedure.     Additional Instructions:      Seven/Six Days before Surgery:  Review your medication instructions, stop indicated medications  Five Days before Surgery:  Review your medication instructions, stop indicated medications  Three Days before Surgery:  Review your medication instructions, stop indicated medications  The Day before Surgery:  Review your medication instructions, stop indicated medications  You will be contacted regarding the time of your arrival to facility and surgery time  Do not eat any food after Midnight  Day of Surgery:  Review your medication instructions, take indicated medications  If you have diabetes, please check  your fasting blood sugar upon awakening.  If fasting blood sugar is <80 mg/dl, drink 100 ml of apple juice, time limit of 2 hours before  You may have clear liquids until TWO hours before surgery/procedure.  This includes water, black tea/coffee, (no milk or cream) apple juice and electrolyte drinks (Gatorade)  You may chew gum up to TWO hours before your surgery/procedure  Wear  comfortable loose fitting clothing  Do not use moisturizers, creams, lotions or perfume  All jewelry and valuables should be left at home     CONTACT SURGEON'S OFFICE IF YOU DEVELOP:  * Fever = 100.4 F   * New respiratory symptoms (e.g. cough, shortness of breath, respiratory distress, sore throat)  * Recent loss of taste or smell  *Flu like symptoms such as headache, fatigue or gastrointestinal symptoms  * You develop any open sores, shingles, burning or painful urination   AND/OR:  * You no longer wish to have the surgery.  * Any other personal circumstances change that may lead to the need to cancel or defer this surgery.  *You were admitted to any hospital within one week of your planned procedure.     SMOKING:  *Quitting smoking can make a huge difference to your health and recovery from surgery.    *If you need help with quitting, call 3-154-QUIT-NOW.     THE DAY BEFORE SURGERY:  *Do not eat any food after midnight the night before your surgery.   *You may have up to TEN OUNCES of clear liquids until TWO hours before your instructed ARRIVAL TIME to hospital. This includes water, black tea/coffee, (no milk or cream) apple juice, clear broth and electrolyte drinks (Gatorade). Please avoid clear liquids that are red in color.   *You may chew gum/mints up to TWO hours before your surgery/procedure.     SURGICAL TIME:  *You will be contacted between 2 p.m. and 3 p.m. the business day before your surgery with your arrival time.  *If you haven't received a call by 3pm, call [phone]  *Scheduled surgery times may change and you will be  notified if this occurs-check your personal voicemail for any updates.     ON THE MORNING OF SURGERY:  *Wear comfortable, loose fitting clothing.   *Do not use moisturizers, creams, lotions or perfume.  *All jewelry and valuables should be left at home.  *Prosthetic devices such as contact lenses, hearing aids, dentures, eyelash extensions, hairpins and body piercing must be removed before surgery.     BRING WITH YOU:  *Photo ID and insurance card  *Current list of medications and allergies  *Pacemaker/Defibrillator/Heart stent cards  *CPAP machine and mask  *Slings/splints/crutches  *Copy of your complete Advanced Directive/DHPOA-if applicable  *Neurostimulator implant remote     PARKING AND ARRIVAL:  *Check in at the Main Entrance desk and let them know you are here for surgery.     IF YOU ARE HAVING OUTPATIENT/SAME DAY SURGERY:  *A responsible adult MUST accompany you at the time of discharge and stay with you for 24 hours after your surgery.  *You may NOT drive yourself home after surgery.  *You may use a taxi or ride sharing service (Lyft, Uber) to return home ONLY if you are accompanied by a friend or family member.  *Instructions for resuming your medications will be provided by your surgeon.     Thank you for coming to Pre Admission testing.      If I have prescribe medication please don't forget to  at your pharmacy.      Any questions about today's visit call [phone] and leave a message in the general mailbox.     Patient instructed to ambulate as soon as possible postoperatively to decrease thromboembolic risk.     Karlos Washington, APRN-CNP     Thank you for visiting [facility].  If you have any changes to your health condition, please call the surgeons office to alert them and give them details of your symptoms.        Preoperative Fasting Guidelines     Why must I stop eating and drinking near surgery time?  With sedation, food or liquid in your stomach can enter  your lungs causing serious complications  Increases nausea and vomiting     When do I need to stop eating and drinking before my surgery?  Do not eat any food after midnight the night before your surgery/procedure.  You may have up to TEN ounces of clear liquid until TWO hours before your instructed arrival time to the hospital.  This includes water, black tea/coffee, (no milk or cream) apple juice, and electrolyte drinks (Gatorade)  You may chew gum until TWO hours before your surgery/procedure        Additional Instructions:      The Day before Surgery:  -Review your medication instructions, stop indicated medications  -You will be contacted in the evening regarding the time of your arrival to facility and surgery time     Day of Surgery:  -Review your medication instructions, take indicated medications  -Wear comfortable loose fitting clothing  -Do not use moisturizers, creams, lotions or perfume  -All jewelry and valuables should be left at home                   Preoperative Brain Exercises     What are brain exercises?  A brain exercise is any activity that engages your thinking (cognitive) skills.     What types of activities are considered brain exercises?  Jigsaw puzzles, crossword puzzles, word jumble, memory games, word search, and many more.  Many can be found free online or on your phone via a mobile raymundo.     Why should I do brain exercises before my surgery?  More recent research has shown brain exercise before surgery can lower the risk of postoperative delirium (confusion) which can be especially important for older adults.  Patients who did brain exercises for 5 to 10 hours the days before surgery, cut their risk of postoperative delirium in half up to 1 week after surgery.                         [facility]     Preoperative Deep Breathing Exercises     Why it is important to do deep breathing exercises before my surgery?  Deep breathing exercises strengthen your breathing  muscles.  This helps you to recover after your surgery and decreases the chance of breathing complications.        How are the deep breathing exercises done?  Sit straight with your back supported.  Breathe in deeply and slowly through your nose. Your lower rib cage should expand and your abdomen may move forward.  Hold that breath for 3 to 5 seconds.  Breathe out through pursed lips, slowly and completely.  Rest and repeat 10 times every hour while awake.  Rest longer if you become dizzy or lightheaded.                      [facility]     Preoperative Deep Breathing Exercises     Why it is important to do deep breathing exercises before my surgery?  Deep breathing exercises strengthen your breathing muscles.  This helps you to recover after your surgery and decreases the chance of breathing complications.        How are the deep breathing exercises done?  Sit straight with your back supported.  Breathe in deeply and slowly through your nose. Your lower rib cage should expand and your abdomen may move forward.  Hold that breath for 3 to 5 seconds.  Breathe out through pursed lips, slowly and completely.  Rest and repeat 10 times every hour while awake.  Rest longer if you become dizzy or lightheaded.        Patient Information: Incentive Spirometer  What is an incentive spirometer?  An incentive spirometer is a device used before and after surgery to “exercise” your lungs.  It helps you to take deeper breaths to expand your lungs.  Below is an example of a basic incentive spirometer.  The device you receive may differ slightly but they all function the same.    Why do I need to use an incentive spirometer?  Using your incentive spirometer prepares your lungs for surgery and helps prevent lung problems after surgery.  How do I use my incentive spirometer?  When you're using your incentive spirometer, make sure to breathe through your mouth. If you breathe through your nose, the incentive  spirometer won't work properly. You can hold your nose if you have trouble.  If you feel dizzy at any time, stop and rest. Try again at a later time.  Follow the steps below:  Set up your incentive spirometer, expand the flexible tubing and connect to the outlet.  Sit upright in a chair or bed. Hold the incentive spirometer at eye level.   Put the mouthpiece in your mouth and close your lips tightly around it. Slowly breathe out (exhale) completely.  Breathe in (inhale) slowly through your mouth as deeply as you can. As you take a breath, you will see the piston rise inside the large column. While the piston rises, the indicator should move upwards. It should stay in between the 2 arrows (see Figure).  Try to get the piston as high as you can, while keeping the indicator between the arrows.   If the indicator doesn't stay between the arrows, you're breathing either too fast or too slow.  When you get it as high as you can, hold your breath for 10 seconds, or as long as possible. While you're holding your breath, the piston will slowly fall to the base of the spirometer.  Once the piston reaches the bottom of the spirometer, breathe out slowly through your mouth. Rest for a few seconds.  Repeat 10 times. Try to get the piston to the same level with each breath.  Repeat every hour while awake  You can carefully clean the outside of the mouthpiece with an alcohol wipe or soap and water.       Patient and Family Education             Ways You Can Help Prevent Blood Clots                    This handout explains some simple things you can do to help prevent blood clots.      Blood clots are blockages that can form in the body's veins. When a blood clot forms in your deep veins, it may be called a deep vein thrombosis, or DVT for short. Blood clots can happen in any part of the body where blood flows, but they are most common in the arms and legs. If a piece of a blood clot breaks free and travels to the lungs, it is  called a pulmonary embolus (PE). A PE can be a very serious problem.         Being in the hospital or having surgery can raise your chances of getting a blood clot because you may not be well enough to move around as much as you normally do.         Ways you can help prevent blood clots in the hospital           Wearing SCDs. SCDs stands for Sequential Compression Devices.   SCDs are special sleeves that wrap around your legs  They attach to a pump that fills them with air to gently squeeze your legs every few minutes.   This helps return the blood in your legs to your heart.   SCDs should only be taken off when walking or bathing.   SCDs may not be comfortable, but they can help save your life.                                            Wearing compression stockings - if your doctor orders them. These special snug fitting stockings gently squeeze your legs to help blood flow.       Walking. Walking helps move the blood in your legs.   If your doctor says it is ok, try walking the halls at least   5 times a day. Ask us to help you get up, so you don't fall.      Taking any blood thinning medicines your doctor orders.        Page 1 of 2            [facility]; 3/23   Ways you can help prevent blood clots at home         Wearing compression stockings - if your doctor orders them. ? Walking - to help move the blood in your legs.       Taking any blood thinning medicines your doctor orders.      Signs of a blood clot or PE        Tell your doctor or nurse know right away if you have of the problems listed below.    If you are at home, seek medical care right away. Call 911 for chest pain or problems breathing.                Signs of a blood clot (DVT) - such as pain,  swelling, redness or warmth in your arm or leg      Signs of a pulmonary embolism (PE) - such as chest pain or feeling short of breath

## 2024-08-22 LAB
ATRIAL RATE: 57 BPM
P AXIS: 73 DEGREES
P OFFSET: 164 MS
P ONSET: 100 MS
PR INTERVAL: 244 MS
Q ONSET: 222 MS
QRS COUNT: 10 BEATS
QRS DURATION: 92 MS
QT INTERVAL: 400 MS
QTC CALCULATION(BAZETT): 389 MS
QTC FREDERICIA: 393 MS
R AXIS: 31 DEGREES
T AXIS: 47 DEGREES
T OFFSET: 422 MS
VENTRICULAR RATE: 57 BPM

## 2024-08-27 ENCOUNTER — APPOINTMENT (OUTPATIENT)
Dept: PREADMISSION TESTING | Facility: HOSPITAL | Age: 54
End: 2024-08-27
Payer: COMMERCIAL

## 2024-08-28 ENCOUNTER — SPECIALTY PHARMACY (OUTPATIENT)
Dept: PHARMACY | Facility: CLINIC | Age: 54
End: 2024-08-28

## 2024-08-28 PROCEDURE — RXMED WILLOW AMBULATORY MEDICATION CHARGE

## 2024-08-28 NOTE — PROGRESS NOTES
[facility] Clinical Note    Judson Smith Jr. is a 54 y.o. male, who is on the specialty pharmacy service for management of:  Oncology Core.    Judson Smith Jr. is taking: Orgovyx.    Judson was contacted on 8/28/2024 at 1:06 PM for a virtual pharmacy visit with encounter number [id number] from:   [facility]  [address] [facility] [address]  Dept: [phone]  Dept Fax: [phone]    Judson was offered a Telemedicine Video visit or Telephone visit.  Judson consented to a telephone visit, which was performed.    The most recent encounter visit with the referring prescriber You Dolan on 8/12/24 was reviewed.  Pharmacy will continue to collaborate in the care of this patient with the referring prescriber You Dolan and team.    General Assessment      Impression/Plan  IMPRESSION/PLAN:  Is patient high risk (potential patients:  pregnancy, geriatric, pediatric)? No   Is laboratory follow-up needed? No  Is a clinical intervention needed? No  Next reassessment date? 3 months   Additional comments:     Refer to the encounter summary report for documentation details about patient counseling and education.      Medication Adherence    The importance of adherence was discussed with the patient and they were advised to take the medication as prescribed by their provider. Patient was encouraged to call their physician's office if they have a question regarding a missed dose.        Patient was advised to contact the pharmacy if there are any changes to their medication list, including prescriptions, OTC medications, herbal products, or supplements. Patient was advised of [facility]'s dispensing process, refill timeline, contact information ([phone]), and patient management follow up. Patient confirmed understanding of education conducted during assessment. All patient questions and concerns were  addressed to the best of my ability. Patient was encouraged to contact the specialty pharmacy with any questions or concerns.    Confirmed follow-up outreaches are properly scheduled and reviewed goals of therapy with the patient.        Damaris A Weiland, MukeshD

## 2024-09-09 ENCOUNTER — DOCUMENTATION (OUTPATIENT)
Dept: RADIATION ONCOLOGY | Facility: HOSPITAL | Age: 54
End: 2024-09-09
Payer: COMMERCIAL

## 2024-09-10 ENCOUNTER — ANESTHESIA (OUTPATIENT)
Dept: OPERATING ROOM | Facility: HOSPITAL | Age: 54
End: 2024-09-10
Payer: COMMERCIAL

## 2024-09-10 ENCOUNTER — ANESTHESIA EVENT (OUTPATIENT)
Dept: OPERATING ROOM | Facility: HOSPITAL | Age: 54
End: 2024-09-10
Payer: COMMERCIAL

## 2024-09-10 ENCOUNTER — HOSPITAL ENCOUNTER (OUTPATIENT)
Facility: HOSPITAL | Age: 54
Setting detail: OUTPATIENT SURGERY
Discharge: HOME | End: 2024-09-10
Attending: STUDENT IN AN ORGANIZED HEALTH CARE EDUCATION/TRAINING PROGRAM | Admitting: STUDENT IN AN ORGANIZED HEALTH CARE EDUCATION/TRAINING PROGRAM
Payer: COMMERCIAL

## 2024-09-10 VITALS
HEIGHT: 74 IN | DIASTOLIC BLOOD PRESSURE: 81 MMHG | HEART RATE: 52 BPM | WEIGHT: 211.86 LBS | OXYGEN SATURATION: 100 % | RESPIRATION RATE: 16 BRPM | TEMPERATURE: 97 F | BODY MASS INDEX: 27.19 KG/M2 | SYSTOLIC BLOOD PRESSURE: 138 MMHG

## 2024-09-10 DIAGNOSIS — C61 PROSTATE CANCER (MULTI): ICD-10-CM

## 2024-09-10 PROCEDURE — 3700000002 HC GENERAL ANESTHESIA TIME - EACH INCREMENTAL 1 MINUTE: Performed by: STUDENT IN AN ORGANIZED HEALTH CARE EDUCATION/TRAINING PROGRAM

## 2024-09-10 PROCEDURE — 55874 TPRNL PLMT BIODEGRDABL MATRL: CPT | Performed by: STUDENT IN AN ORGANIZED HEALTH CARE EDUCATION/TRAINING PROGRAM

## 2024-09-10 PROCEDURE — 3600000009 HC OR TIME - EACH INCREMENTAL 1 MINUTE - PROCEDURE LEVEL FOUR: Performed by: STUDENT IN AN ORGANIZED HEALTH CARE EDUCATION/TRAINING PROGRAM

## 2024-09-10 PROCEDURE — A55874 PR TRANSPERINEAL PLACEMENT OF BIODEGRADABLE MATERIAL, PERI-PROSTATIC, SINGLE OR MULTIPLE: Performed by: ANESTHESIOLOGY

## 2024-09-10 PROCEDURE — A55874 PR TRANSPERINEAL PLACEMENT OF BIODEGRADABLE MATERIAL, PERI-PROSTATIC, SINGLE OR MULTIPLE: Performed by: NURSE ANESTHETIST, CERTIFIED REGISTERED

## 2024-09-10 PROCEDURE — 76872 US TRANSRECTAL: CPT | Performed by: STUDENT IN AN ORGANIZED HEALTH CARE EDUCATION/TRAINING PROGRAM

## 2024-09-10 PROCEDURE — 55876 PLACE RT DEVICE/MARKER PROS: CPT | Performed by: STUDENT IN AN ORGANIZED HEALTH CARE EDUCATION/TRAINING PROGRAM

## 2024-09-10 PROCEDURE — 2500000004 HC RX 250 GENERAL PHARMACY W/ HCPCS (ALT 636 FOR OP/ED): Performed by: STUDENT IN AN ORGANIZED HEALTH CARE EDUCATION/TRAINING PROGRAM

## 2024-09-10 PROCEDURE — 7100000002 HC RECOVERY ROOM TIME - EACH INCREMENTAL 1 MINUTE: Performed by: STUDENT IN AN ORGANIZED HEALTH CARE EDUCATION/TRAINING PROGRAM

## 2024-09-10 PROCEDURE — C1889 IMPLANT/INSERT DEVICE, NOC: HCPCS | Performed by: STUDENT IN AN ORGANIZED HEALTH CARE EDUCATION/TRAINING PROGRAM

## 2024-09-10 PROCEDURE — 2500000004 HC RX 250 GENERAL PHARMACY W/ HCPCS (ALT 636 FOR OP/ED): Performed by: NURSE ANESTHETIST, CERTIFIED REGISTERED

## 2024-09-10 PROCEDURE — 7100000009 HC PHASE TWO TIME - INITIAL BASE CHARGE: Performed by: STUDENT IN AN ORGANIZED HEALTH CARE EDUCATION/TRAINING PROGRAM

## 2024-09-10 PROCEDURE — 3700000001 HC GENERAL ANESTHESIA TIME - INITIAL BASE CHARGE: Performed by: STUDENT IN AN ORGANIZED HEALTH CARE EDUCATION/TRAINING PROGRAM

## 2024-09-10 PROCEDURE — 2500000004 HC RX 250 GENERAL PHARMACY W/ HCPCS (ALT 636 FOR OP/ED): Mod: JZ | Performed by: STUDENT IN AN ORGANIZED HEALTH CARE EDUCATION/TRAINING PROGRAM

## 2024-09-10 PROCEDURE — 7100000001 HC RECOVERY ROOM TIME - INITIAL BASE CHARGE: Performed by: STUDENT IN AN ORGANIZED HEALTH CARE EDUCATION/TRAINING PROGRAM

## 2024-09-10 PROCEDURE — 2780000003 HC OR 278 NO HCPCS: Performed by: STUDENT IN AN ORGANIZED HEALTH CARE EDUCATION/TRAINING PROGRAM

## 2024-09-10 PROCEDURE — 7100000010 HC PHASE TWO TIME - EACH INCREMENTAL 1 MINUTE: Performed by: STUDENT IN AN ORGANIZED HEALTH CARE EDUCATION/TRAINING PROGRAM

## 2024-09-10 PROCEDURE — 3600000004 HC OR TIME - INITIAL BASE CHARGE - PROCEDURE LEVEL FOUR: Performed by: STUDENT IN AN ORGANIZED HEALTH CARE EDUCATION/TRAINING PROGRAM

## 2024-09-10 DEVICE — STERILE PLACEMENT NEEDLES (17GA ETW X 20CM) WITH BONE WAX AND (1.2 X 3MM) SOFT TISSUE GOLD MARKER [3]
Type: IMPLANTABLE DEVICE | Site: RECTUM | Status: FUNCTIONAL
Brand: FIDUCIAL MARKER KIT

## 2024-09-10 RX ORDER — ONDANSETRON HYDROCHLORIDE 2 MG/ML
4 INJECTION, SOLUTION INTRAVENOUS ONCE AS NEEDED
Status: DISCONTINUED | OUTPATIENT
Start: 2024-09-10 | End: 2024-09-10 | Stop reason: HOSPADM

## 2024-09-10 RX ORDER — OXYCODONE HYDROCHLORIDE 5 MG/1
5 TABLET ORAL EVERY 4 HOURS PRN
Status: DISCONTINUED | OUTPATIENT
Start: 2024-09-10 | End: 2024-09-10 | Stop reason: HOSPADM

## 2024-09-10 RX ORDER — BUPIVACAINE HYDROCHLORIDE 5 MG/ML
INJECTION, SOLUTION PERINEURAL AS NEEDED
Status: DISCONTINUED | OUTPATIENT
Start: 2024-09-10 | End: 2024-09-10 | Stop reason: HOSPADM

## 2024-09-10 RX ORDER — MIDAZOLAM HYDROCHLORIDE 1 MG/ML
INJECTION, SOLUTION INTRAMUSCULAR; INTRAVENOUS AS NEEDED
Status: DISCONTINUED | OUTPATIENT
Start: 2024-09-10 | End: 2024-09-10

## 2024-09-10 RX ORDER — HYDRALAZINE HYDROCHLORIDE 20 MG/ML
5 INJECTION INTRAMUSCULAR; INTRAVENOUS EVERY 30 MIN PRN
Status: DISCONTINUED | OUTPATIENT
Start: 2024-09-10 | End: 2024-09-10 | Stop reason: HOSPADM

## 2024-09-10 RX ORDER — DIPHENHYDRAMINE HYDROCHLORIDE 50 MG/ML
12.5 INJECTION INTRAMUSCULAR; INTRAVENOUS ONCE AS NEEDED
Status: DISCONTINUED | OUTPATIENT
Start: 2024-09-10 | End: 2024-09-10 | Stop reason: HOSPADM

## 2024-09-10 RX ORDER — HYDROMORPHONE HYDROCHLORIDE 0.2 MG/ML
0.2 INJECTION INTRAMUSCULAR; INTRAVENOUS; SUBCUTANEOUS EVERY 5 MIN PRN
Status: DISCONTINUED | OUTPATIENT
Start: 2024-09-10 | End: 2024-09-10 | Stop reason: HOSPADM

## 2024-09-10 RX ORDER — PROPOFOL 10 MG/ML
INJECTION, EMULSION INTRAVENOUS AS NEEDED
Status: DISCONTINUED | OUTPATIENT
Start: 2024-09-10 | End: 2024-09-10

## 2024-09-10 RX ORDER — KETOROLAC TROMETHAMINE 30 MG/ML
INJECTION, SOLUTION INTRAMUSCULAR; INTRAVENOUS AS NEEDED
Status: DISCONTINUED | OUTPATIENT
Start: 2024-09-10 | End: 2024-09-10

## 2024-09-10 RX ORDER — SODIUM CHLORIDE, SODIUM LACTATE, POTASSIUM CHLORIDE, CALCIUM CHLORIDE 600; 310; 30; 20 MG/100ML; MG/100ML; MG/100ML; MG/100ML
100 INJECTION, SOLUTION INTRAVENOUS CONTINUOUS
Status: DISCONTINUED | OUTPATIENT
Start: 2024-09-10 | End: 2024-09-10 | Stop reason: HOSPADM

## 2024-09-10 RX ORDER — IPRATROPIUM BROMIDE 0.5 MG/2.5ML
500 SOLUTION RESPIRATORY (INHALATION) ONCE
Status: DISCONTINUED | OUTPATIENT
Start: 2024-09-10 | End: 2024-09-10 | Stop reason: HOSPADM

## 2024-09-10 RX ORDER — FENTANYL CITRATE 50 UG/ML
25 INJECTION, SOLUTION INTRAMUSCULAR; INTRAVENOUS EVERY 5 MIN PRN
Status: DISCONTINUED | OUTPATIENT
Start: 2024-09-10 | End: 2024-09-10 | Stop reason: HOSPADM

## 2024-09-10 RX ORDER — FENTANYL CITRATE 50 UG/ML
INJECTION, SOLUTION INTRAMUSCULAR; INTRAVENOUS AS NEEDED
Status: DISCONTINUED | OUTPATIENT
Start: 2024-09-10 | End: 2024-09-10

## 2024-09-10 RX ORDER — CEFAZOLIN SODIUM 2 G/100ML
2 INJECTION, SOLUTION INTRAVENOUS ONCE
Status: COMPLETED | OUTPATIENT
Start: 2024-09-10 | End: 2024-09-10

## 2024-09-10 RX ORDER — ALBUTEROL SULFATE 0.83 MG/ML
2.5 SOLUTION RESPIRATORY (INHALATION) ONCE AS NEEDED
Status: DISCONTINUED | OUTPATIENT
Start: 2024-09-10 | End: 2024-09-10 | Stop reason: HOSPADM

## 2024-09-10 RX ORDER — MEPERIDINE HYDROCHLORIDE 25 MG/ML
12.5 INJECTION INTRAMUSCULAR; INTRAVENOUS; SUBCUTANEOUS EVERY 10 MIN PRN
Status: DISCONTINUED | OUTPATIENT
Start: 2024-09-10 | End: 2024-09-10 | Stop reason: HOSPADM

## 2024-09-10 SDOH — HEALTH STABILITY: MENTAL HEALTH: CURRENT SMOKER: 0

## 2024-09-10 ASSESSMENT — PAIN - FUNCTIONAL ASSESSMENT
PAIN_FUNCTIONAL_ASSESSMENT: 0-10

## 2024-09-10 ASSESSMENT — PAIN SCALES - GENERAL
PAINLEVEL_OUTOF10: 0 - NO PAIN
PAIN_LEVEL: 0
PAINLEVEL_OUTOF10: 0 - NO PAIN

## 2024-09-10 NOTE — PERIOPERATIVE NURSING NOTE
0801 Arrives to pacu talking incessantly. Changing subjects frequently, work, family, home, etc. No memlry of spoken words unable to answer questions.

## 2024-09-10 NOTE — ANESTHESIA POSTPROCEDURE EVALUATION
Patient: Judson Smith Jr.    Procedure Summary       Date: 09/10/24 Room / Location: [facility] 02 / Virtual [facility]    Anesthesia Start: 0734 Anesthesia Stop: 0814    Procedure: Insertion Fiducial Marker Prostate Diagnosis:       Prostate cancer (Multi)      (Prostate cancer (Multi) [C61])    Surgeons: Andrea Brooke MD Responsible Provider: Frank Gay MD    Anesthesia Type: MAC ASA Status: 1            Anesthesia Type: MAC    Vitals Value Taken Time   /71 09/10/24 0825   Temp 36.6 °C (97.9 °F) 09/10/24 0825   Pulse 61 09/10/24 0825   Resp 18 09/10/24 0825   SpO2 100 % 09/10/24 0825       Anesthesia Post Evaluation    Patient location during evaluation: PACU  Patient participation: complete - patient participated  Level of consciousness: awake  Pain score: 0  Pain management: adequate  Multimodal analgesia pain management approach  Airway patency: patent  Two or more strategies used to mitigate risk of obstructive sleep apnea  Cardiovascular status: acceptable  Respiratory status: acceptable  Hydration status: acceptable  Postoperative Nausea and Vomiting: none        There were no known notable events for this encounter.

## 2024-09-10 NOTE — DISCHARGE INSTRUCTIONS
Dr. Brooke - [facility]  Phone: [phone]    Follow-Up Information  For patients receiving spaceoar and Fiducial Markers, please follow up with Radiology Oncology as scheduled for Planning.    Medication  Please take the medications as prescribed by your doctor. Tylenol or non-steroids! anti-inflammatory medications (such as Aleve®) should relieve mild pain and discomfort. Resume the usual medications you took before surgery unless instructed otherwise.    Resuming Activities and Driving  *NO Driving on the day of surgery  *You may resume driving the day after surgery  *You may resume normal activities as tolerated  *You may shower     Diet  You may resume your normal diet once at home, with no additional considerations.    Expected Signs and Symptoms  You may have a small amount of bleeding with urination on occasion. This may be accompanied with small blood clots. This is normal and should be relieved by increasing your fluid intake.  You may experience some mild burning and discomfort during urination. This is normal and should subside in one to two weeks.      When to Call Your Doctor - Dr. Brooke [phone]  Please call the office immediately if any of the following symptoms appear:    *Inability to eat, drink, or take medication  *Persistent Nausea or Vomiting  *Fever over 100.4 degrees F. (38 degrees C.)    Please call 9-1-1 if you experience any of the following:  *Chest Pain, Shortness of Breath or Difficulty Breathing  *Sudden one sided weakness/slurred speech/ any signs or symptoms of a stroke  *Severe headache or visual disturbance    Additional Home-Going Instructions for Adults Who Have Had Anesthesia or Sedatives:    The anesthetics, sedatives and pain killers which were given to you will be acting in your body for the next 24 hours.  This may cause you to feel sleepy.  This feeling will slowly wear off.    For the next 24 hours you SHOULD NOT:  *Drive a car, or operate machinery or power  tools  *Drink any form of alcohol (including beer or wine)  *Make any important Decisions

## 2024-09-10 NOTE — ANESTHESIA PREPROCEDURE EVALUATION
Patient: Judson Smith Jr.    Procedure Information       Date/Time: 09/10/24 0730    Procedure: Insertion Fiducial Marker Prostate    Location: [facility] 02 / [facility]    Surgeons: Andrea Brooke MD            Relevant Problems   Anesthesia (within normal limits)      Cardiac (within normal limits)      Pulmonary (within normal limits)      Neuro (within normal limits)      GI (within normal limits)      /Renal   (+) Prostate cancer (Multi)      Liver (within normal limits)      Endocrine (within normal limits)      Hematology (within normal limits)      Musculoskeletal (within normal limits)      HEENT (within normal limits)      ID   (+) Venereal disease   (+) Viral upper respiratory infection      Skin (within normal limits)      GYN (within normal limits)       Clinical information reviewed:                 No Known Allergies  Prior to Admission medications    Medication Sig Start Date End Date Taking? Authorizing Provider   cholecalciferol (Vitamin D-3) 25 MCG (1000 UT) tablet Take 1 tablet (25 mcg) by mouth once daily. 1/19/23   Historical Provider, MD   multivitamin tablet Take 1 tablet by mouth once daily.    Historical Provider, MD   relugolix (Orgovyx) 120 mg tablet Take 1 tablet (120 mg total) by mouth once daily.  If missed more than 7 days in a row, take 3 tabs before going back to 1 tab per day 6/28/24 11/25/24  Melissa Lopez MD PhD   tadalafil (Cialis) 5 mg tablet Take 1 tablet (5 mg) by mouth once daily. 2/12/24 8/21/24  Jim Molina MD     Past Medical History:   Diagnosis Date   • Prostate cancer (Multi)    • Vision loss      Past Surgical History:   Procedure Laterality Date   • NOSE SURGERY      AGE 11   • PROSTATE BIOPSY         NPO Detail:  No data recorded     Physical Exam    Airway  Mallampati: I  TM distance: <3 FB  Neck ROM: full     Cardiovascular - normal exam     Dental        Pulmonary - normal exam     Abdominal - normal exam           Anesthesia  Plan    History of general anesthesia?: yes  History of complications of general anesthesia?: no    ASA 1     MAC     The patient is not a current smoker.  Patient was not previously instructed to abstain from smoking on day of procedure.  Patient did not smoke on day of procedure.  Education provided regarding risk of obstructive sleep apnea.  intravenous induction   Anesthetic plan and risks discussed with patient.    Plan discussed with CRNA and CAA.

## 2024-09-10 NOTE — OP NOTE
Insertion Fiducial Marker Prostate Operative Note     Date: 9/10/2024  OR Location: [facility]    Name: Judson Smith Jr., : 1970, Age: 54 y.o., MRN: [id number], Sex: male    Diagnosis  Pre-op Diagnosis      * Prostate cancer (Multi) [C61] Post-op Diagnosis     * Prostate cancer (Multi) [C61]     Procedures  Insertion Fiducial Marker Prostate  05994 - CT PLMT INTERSTITIAL DEV RADIAT TX PROSTATE 1/MULT    CT TRANSPERINEAL PLMT BIODEGRADABLE MATRL 1/MLT NJX [70645]  Surgeons      * Andrea Brooke - Primary    Resident/Fellow/Other Assistant:  Surgeons and Role:  * No surgeons found with a matching role *         Preoperative Diagnosis  Prostate cancer.       Postoperative Diagnosis  Same.       Procedure Performed  SpaceOAR and Fiducial placement, Transrectal ultrasound .   Surgeon  Andrea Brooke MD ([id number]) - Urology.   Assisted by  N/A.      Details of Procedure     Anesthesia  MAC.   Estimated Blood Loss (ml)  5.   Specimen(s) Removed  None.   Drain(s)  None.   Implant(s)  None.   Complications  None.   Indications (History)  Prostate cancer.   Findings of Procedure  35g heterogenous.   Description of Procedure  After administration of anesthesia and antibiotics, the patient was placed in the dorsal lithotomy position and prepped and draped in the usual sterile fashion. A prostate block was performed and transrectal ultrasound using a stepper. The prostate was measured. There were some hypoechoic areas. Fiducials were placed at the base, apex and mid prostate. The perirectal fat was identified using the finder needle with excellent dispersion of saline at the mid prostate. SpaceOAR Jarred was then placed in this space. Excellent placement was confirmed in both the transverse and saggital planes. There was no puncture of the rectal wall during the procedure. .

## 2024-09-10 NOTE — H&P
"History Of Present Illness  Joe Smith Jr. is a 54 y.o. male presenting with prostate cancer.     Past Medical History  Past Medical History:   Diagnosis Date    Prostate cancer (Multi)     Vision loss        Surgical History  Past Surgical History:   Procedure Laterality Date    NOSE SURGERY      AGE 11    PROSTATE BIOPSY          Social History  He reports that he has been smoking cigars. He has never used smokeless tobacco. He reports current alcohol use of about 9.0 standard drinks of alcohol per week. He reports that he does not use drugs.    Family History  Family History   Problem Relation Name Age of Onset    COPD Mother SOY     Other (VASOVAGAL) Mother SOY     Atrial fibrillation Mother SOY     Cancer Father JOE LEAL     Prostate cancer Father JOE LEAL         Allergies  Patient has no known allergies.    Review of Systems     Physical Exam     Last Recorded Vitals  Blood pressure (!) 144/91, pulse 62, temperature 35.9 °C (96.6 °F), temperature source Temporal, resp. rate 14, height 1.88 m (6' 2.02\"), weight 96.1 kg (211 lb 13.8 oz), SpO2 98%.    Relevant Results             Assessment/Plan   Assessment & Plan  Prostate cancer (Multi)      [employer] and [employer]ials       I spent  minutes in the professional and overall care of this patient.      Andrea Brooke MD    "

## 2024-09-11 ENCOUNTER — PHARMACY VISIT (OUTPATIENT)
Dept: PHARMACY | Facility: CLINIC | Age: 54
End: 2024-09-11
Payer: COMMERCIAL

## 2024-09-17 ENCOUNTER — HOSPITAL ENCOUNTER (OUTPATIENT)
Dept: RADIOLOGY | Facility: EXTERNAL LOCATION | Age: 54
Discharge: HOME | End: 2024-09-17

## 2024-09-17 DIAGNOSIS — C61 PROSTATE CANCER (MULTI): ICD-10-CM

## 2024-09-19 ENCOUNTER — HOSPITAL ENCOUNTER (OUTPATIENT)
Dept: RADIOLOGY | Facility: EXTERNAL LOCATION | Age: 54
Discharge: HOME | End: 2024-09-19

## 2024-09-19 ENCOUNTER — HOSPITAL ENCOUNTER (OUTPATIENT)
Dept: RADIATION ONCOLOGY | Facility: HOSPITAL | Age: 54
Setting detail: RADIATION/ONCOLOGY SERIES
Discharge: HOME | End: 2024-09-19
Payer: COMMERCIAL

## 2024-09-19 DIAGNOSIS — C61 PROSTATE CANCER (MULTI): ICD-10-CM

## 2024-09-19 DIAGNOSIS — C61 PROSTATE CANCER (MULTI): Primary | ICD-10-CM

## 2024-09-25 ENCOUNTER — HOSPITAL ENCOUNTER (OUTPATIENT)
Dept: RADIATION ONCOLOGY | Facility: HOSPITAL | Age: 54
Setting detail: RADIATION/ONCOLOGY SERIES
Discharge: HOME | End: 2024-09-25
Payer: COMMERCIAL

## 2024-09-25 PROCEDURE — 77370 RADIATION PHYSICS CONSULT: CPT | Performed by: STUDENT IN AN ORGANIZED HEALTH CARE EDUCATION/TRAINING PROGRAM

## 2024-09-25 PROCEDURE — 77334 RADIATION TREATMENT AID(S): CPT | Performed by: STUDENT IN AN ORGANIZED HEALTH CARE EDUCATION/TRAINING PROGRAM

## 2024-09-25 PROCEDURE — 77295 3-D RADIOTHERAPY PLAN: CPT | Performed by: STUDENT IN AN ORGANIZED HEALTH CARE EDUCATION/TRAINING PROGRAM

## 2024-09-25 PROCEDURE — 77300 RADIATION THERAPY DOSE PLAN: CPT | Performed by: STUDENT IN AN ORGANIZED HEALTH CARE EDUCATION/TRAINING PROGRAM

## 2024-09-25 PROCEDURE — RXMED WILLOW AMBULATORY MEDICATION CHARGE

## 2024-10-01 ENCOUNTER — APPOINTMENT (OUTPATIENT)
Dept: RADIATION ONCOLOGY | Facility: HOSPITAL | Age: 54
End: 2024-10-01
Payer: COMMERCIAL

## 2024-10-03 ENCOUNTER — SPECIALTY PHARMACY (OUTPATIENT)
Dept: PHARMACY | Facility: CLINIC | Age: 54
End: 2024-10-03

## 2024-10-03 ENCOUNTER — APPOINTMENT (OUTPATIENT)
Dept: RADIATION ONCOLOGY | Facility: HOSPITAL | Age: 54
End: 2024-10-03
Payer: COMMERCIAL

## 2024-10-07 ENCOUNTER — APPOINTMENT (OUTPATIENT)
Dept: RADIATION ONCOLOGY | Facility: HOSPITAL | Age: 54
End: 2024-10-07
Payer: COMMERCIAL

## 2024-10-09 ENCOUNTER — APPOINTMENT (OUTPATIENT)
Dept: RADIATION ONCOLOGY | Facility: HOSPITAL | Age: 54
End: 2024-10-09
Payer: COMMERCIAL

## 2024-10-10 ENCOUNTER — PHARMACY VISIT (OUTPATIENT)
Dept: PHARMACY | Facility: CLINIC | Age: 54
End: 2024-10-10
Payer: COMMERCIAL

## 2024-10-11 ENCOUNTER — APPOINTMENT (OUTPATIENT)
Dept: RADIATION ONCOLOGY | Facility: HOSPITAL | Age: 54
End: 2024-10-11
Payer: COMMERCIAL

## 2024-10-15 ENCOUNTER — HOSPITAL ENCOUNTER (OUTPATIENT)
Dept: RADIATION ONCOLOGY | Facility: HOSPITAL | Age: 54
Setting detail: RADIATION/ONCOLOGY SERIES
Discharge: HOME | End: 2024-10-15
Payer: COMMERCIAL

## 2024-10-15 VITALS
TEMPERATURE: 97.2 F | HEART RATE: 73 BPM | OXYGEN SATURATION: 100 % | RESPIRATION RATE: 18 BRPM | DIASTOLIC BLOOD PRESSURE: 80 MMHG | SYSTOLIC BLOOD PRESSURE: 129 MMHG

## 2024-10-15 DIAGNOSIS — Z51.0 ENCOUNTER FOR ANTINEOPLASTIC RADIATION THERAPY: ICD-10-CM

## 2024-10-15 DIAGNOSIS — C61 MALIGNANT NEOPLASM OF PROSTATE (MULTI): ICD-10-CM

## 2024-10-15 LAB
RAD ONC MSQ ACTUAL FRACTIONS DELIVERED: 1
RAD ONC MSQ ACTUAL SESSION DELIVERED DOSE: 750 CGRAY
RAD ONC MSQ ACTUAL TOTAL DOSE: 750 CGRAY
RAD ONC MSQ ELAPSED DAYS: 0
RAD ONC MSQ LAST DATE: NORMAL
RAD ONC MSQ PRESCRIBED FRACTIONAL DOSE: 750 CGRAY
RAD ONC MSQ PRESCRIBED NUMBER OF FRACTIONS: 5
RAD ONC MSQ PRESCRIBED TECHNIQUE: NORMAL
RAD ONC MSQ PRESCRIBED TOTAL DOSE: 3750 CGRAY
RAD ONC MSQ PRESCRIPTION PATTERN COMMENT: NORMAL
RAD ONC MSQ START DATE: NORMAL
RAD ONC MSQ TREATMENT COURSE NUMBER: 1
RAD ONC MSQ TREATMENT SITE: NORMAL

## 2024-10-15 PROCEDURE — 77373 STRTCTC BDY RAD THER TX DLVR: CPT | Performed by: RADIOLOGY

## 2024-10-15 PROCEDURE — 77280 THER RAD SIMULAJ FIELD SMPL: CPT | Performed by: RADIOLOGY

## 2024-10-15 ASSESSMENT — PAIN SCALES - GENERAL: PAINLEVEL: 0-NO PAIN

## 2024-10-15 NOTE — PROGRESS NOTES
RADIATION ONCOLOGY ON-TREATMENT VISIT NOTE  Patient Name:  Judson Smith Jr.  MRN:  [id number]  :  1970    Radiation Oncologist: Melissa Lopez MD PhD  Primary Care Provider: No Assigned PCP Generic Provider, MD  Care Team: Patient Care Team:  No Assigned Pcp Generic Provider, MD as PCP - General (General Practice)  Melissa Lopez MD PhD as Radiation Oncologist (Radiation Oncology)  Dean Tsai MD as Consulting Physician (Hematology and Oncology)    Date of Service: 10/15/2024     Judson Smith Jr. is a 54 y.o.-year-old with:  Cancer Staging   Prostate cancer (Multi)  Staging form: Prostate, AJCC 8th Edition  - Clinical stage from 10/17/2023: Stage IIC (cT1c, cN0, cM0, PSA: 6.7, Grade Group: 3) - Signed by Melissa Lopez MD PhD on 12/15/2023    Specialty Problems       Radiation Oncology Problems    Prostate cancer (Multi)     Treatment Summary:  SBRT: Not Applicable Prostate    Treatment Period Technique Fraction Dose Fractions Total Dose   Course 1 10/15/2024-10/15/2024  (days elapsed: 0)         ProstSV 10/15/2024-10/15/2024 SBRT 750 / 750 cGy 1 / 5 750 / 3,750 cGy     Concurrent systemic therapy: Orgovyx, plan 6 months, started in 2024    SUBJECTIVE: Feeling well. Denies any urinary or bowel changes compared to baseline.     OBJECTIVE:   Vital Signs:  /80 (BP Location: Left arm, Patient Position: Sitting)   Pulse 73   Temp 36.2 °C (97.2 °F)   Resp 18   SpO2 100%    Pain Scale: 0 /10.      Toxicity Assessment          10/15/2024    11:00   Toxicity Assessment   Adverse Events Reviewed (WDL) No (Exceptions to WDL)   Treatment Site Pelvis - male   Diarrhea Grade 0   Fatigue Grade 0   Pain Grade 0   Constipation Grade 0   Proctitis Grade 0   Hematuria Grade 0   Urinary Incontinence Grade 0   Erectile Dysfunction Grade 0   Urinary Frequency Grade 0   Urinary Retention Grade 0   Urinary Urgency Grade 0      ASSESSMENT/PLAN:  The patient is tolerating radiation therapy as  anticipated.  Continue per current treatment plan.        Melissa Lopze  , Radiation Oncology

## 2024-10-17 ENCOUNTER — HOSPITAL ENCOUNTER (OUTPATIENT)
Dept: RADIATION ONCOLOGY | Facility: HOSPITAL | Age: 54
Setting detail: RADIATION/ONCOLOGY SERIES
Discharge: HOME | End: 2024-10-17
Payer: COMMERCIAL

## 2024-10-17 DIAGNOSIS — C61 MALIGNANT NEOPLASM OF PROSTATE (MULTI): ICD-10-CM

## 2024-10-17 DIAGNOSIS — Z51.0 ENCOUNTER FOR ANTINEOPLASTIC RADIATION THERAPY: ICD-10-CM

## 2024-10-17 LAB
RAD ONC MSQ ACTUAL FRACTIONS DELIVERED: 2
RAD ONC MSQ ACTUAL SESSION DELIVERED DOSE: 750 CGRAY
RAD ONC MSQ ACTUAL TOTAL DOSE: 1500 CGRAY
RAD ONC MSQ ELAPSED DAYS: 2
RAD ONC MSQ LAST DATE: NORMAL
RAD ONC MSQ PRESCRIBED FRACTIONAL DOSE: 750 CGRAY
RAD ONC MSQ PRESCRIBED NUMBER OF FRACTIONS: 5
RAD ONC MSQ PRESCRIBED TECHNIQUE: NORMAL
RAD ONC MSQ PRESCRIBED TOTAL DOSE: 3750 CGRAY
RAD ONC MSQ PRESCRIPTION PATTERN COMMENT: NORMAL
RAD ONC MSQ START DATE: NORMAL
RAD ONC MSQ TREATMENT COURSE NUMBER: 1
RAD ONC MSQ TREATMENT SITE: NORMAL

## 2024-10-17 PROCEDURE — 77373 STRTCTC BDY RAD THER TX DLVR: CPT | Performed by: RADIOLOGY

## 2024-10-21 ENCOUNTER — APPOINTMENT (OUTPATIENT)
Dept: RADIATION ONCOLOGY | Facility: HOSPITAL | Age: 54
End: 2024-10-21
Payer: COMMERCIAL

## 2024-10-22 ENCOUNTER — HOSPITAL ENCOUNTER (OUTPATIENT)
Dept: RADIATION ONCOLOGY | Facility: HOSPITAL | Age: 54
Setting detail: RADIATION/ONCOLOGY SERIES
Discharge: HOME | End: 2024-10-22
Payer: COMMERCIAL

## 2024-10-22 ENCOUNTER — EDUCATION (OUTPATIENT)
Dept: CASE MANAGEMENT | Facility: HOSPITAL | Age: 54
End: 2024-10-22
Payer: COMMERCIAL

## 2024-10-22 ENCOUNTER — TELEPHONE (OUTPATIENT)
Dept: SCHEDULING | Age: 54
End: 2024-10-22
Payer: COMMERCIAL

## 2024-10-22 DIAGNOSIS — Z51.0 ENCOUNTER FOR ANTINEOPLASTIC RADIATION THERAPY: ICD-10-CM

## 2024-10-22 DIAGNOSIS — C61 MALIGNANT NEOPLASM OF PROSTATE (MULTI): ICD-10-CM

## 2024-10-22 DIAGNOSIS — Z31.62 ENCOUNTER FOR FERTILITY PRESERVATION COUNSELING: ICD-10-CM

## 2024-10-22 LAB
RAD ONC MSQ ACTUAL FRACTIONS DELIVERED: 3
RAD ONC MSQ ACTUAL SESSION DELIVERED DOSE: 750 CGRAY
RAD ONC MSQ ACTUAL TOTAL DOSE: 2250 CGRAY
RAD ONC MSQ ELAPSED DAYS: 7
RAD ONC MSQ LAST DATE: NORMAL
RAD ONC MSQ PRESCRIBED FRACTIONAL DOSE: 750 CGRAY
RAD ONC MSQ PRESCRIBED NUMBER OF FRACTIONS: 5
RAD ONC MSQ PRESCRIBED TECHNIQUE: NORMAL
RAD ONC MSQ PRESCRIBED TOTAL DOSE: 3750 CGRAY
RAD ONC MSQ PRESCRIPTION PATTERN COMMENT: NORMAL
RAD ONC MSQ START DATE: NORMAL
RAD ONC MSQ TREATMENT COURSE NUMBER: 1
RAD ONC MSQ TREATMENT SITE: NORMAL

## 2024-10-22 PROCEDURE — 77373 STRTCTC BDY RAD THER TX DLVR: CPT | Performed by: STUDENT IN AN ORGANIZED HEALTH CARE EDUCATION/TRAINING PROGRAM

## 2024-10-23 ENCOUNTER — APPOINTMENT (OUTPATIENT)
Dept: RADIATION ONCOLOGY | Facility: HOSPITAL | Age: 54
End: 2024-10-23
Payer: COMMERCIAL

## 2024-10-24 ENCOUNTER — HOSPITAL ENCOUNTER (OUTPATIENT)
Dept: RADIATION ONCOLOGY | Facility: HOSPITAL | Age: 54
Setting detail: RADIATION/ONCOLOGY SERIES
Discharge: HOME | End: 2024-10-24
Payer: COMMERCIAL

## 2024-10-24 DIAGNOSIS — C61 MALIGNANT NEOPLASM OF PROSTATE (MULTI): ICD-10-CM

## 2024-10-24 DIAGNOSIS — Z31.62 ENCOUNTER FOR FERTILITY PRESERVATION COUNSELING: ICD-10-CM

## 2024-10-24 DIAGNOSIS — Z51.0 ENCOUNTER FOR ANTINEOPLASTIC RADIATION THERAPY: ICD-10-CM

## 2024-10-24 LAB
RAD ONC MSQ ACTUAL FRACTIONS DELIVERED: 4
RAD ONC MSQ ACTUAL SESSION DELIVERED DOSE: 750 CGRAY
RAD ONC MSQ ACTUAL TOTAL DOSE: 3000 CGRAY
RAD ONC MSQ ELAPSED DAYS: 9
RAD ONC MSQ LAST DATE: NORMAL
RAD ONC MSQ PRESCRIBED FRACTIONAL DOSE: 750 CGRAY
RAD ONC MSQ PRESCRIBED NUMBER OF FRACTIONS: 5
RAD ONC MSQ PRESCRIBED TECHNIQUE: NORMAL
RAD ONC MSQ PRESCRIBED TOTAL DOSE: 3750 CGRAY
RAD ONC MSQ PRESCRIPTION PATTERN COMMENT: NORMAL
RAD ONC MSQ START DATE: NORMAL
RAD ONC MSQ TREATMENT COURSE NUMBER: 1
RAD ONC MSQ TREATMENT SITE: NORMAL

## 2024-10-24 PROCEDURE — 77373 STRTCTC BDY RAD THER TX DLVR: CPT | Performed by: RADIOLOGY

## 2024-10-25 ENCOUNTER — APPOINTMENT (OUTPATIENT)
Dept: RADIATION ONCOLOGY | Facility: HOSPITAL | Age: 54
End: 2024-10-25
Payer: COMMERCIAL

## 2024-10-29 ENCOUNTER — HOSPITAL ENCOUNTER (OUTPATIENT)
Dept: RADIATION ONCOLOGY | Facility: HOSPITAL | Age: 54
Setting detail: RADIATION/ONCOLOGY SERIES
Discharge: HOME | End: 2024-10-29
Payer: COMMERCIAL

## 2024-10-29 ENCOUNTER — DOCUMENTATION (OUTPATIENT)
Dept: RADIATION ONCOLOGY | Facility: HOSPITAL | Age: 54
End: 2024-10-29
Payer: COMMERCIAL

## 2024-10-29 DIAGNOSIS — C61 MALIGNANT NEOPLASM OF PROSTATE (MULTI): ICD-10-CM

## 2024-10-29 DIAGNOSIS — C61 PROSTATE CANCER (MULTI): Primary | ICD-10-CM

## 2024-10-29 DIAGNOSIS — Z51.0 ENCOUNTER FOR ANTINEOPLASTIC RADIATION THERAPY: ICD-10-CM

## 2024-10-29 LAB
RAD ONC MSQ ACTUAL FRACTIONS DELIVERED: 5
RAD ONC MSQ ACTUAL SESSION DELIVERED DOSE: 750 CGRAY
RAD ONC MSQ ACTUAL TOTAL DOSE: 3750 CGRAY
RAD ONC MSQ ELAPSED DAYS: 14
RAD ONC MSQ LAST DATE: NORMAL
RAD ONC MSQ PRESCRIBED FRACTIONAL DOSE: 750 CGRAY
RAD ONC MSQ PRESCRIBED NUMBER OF FRACTIONS: 5
RAD ONC MSQ PRESCRIBED TECHNIQUE: NORMAL
RAD ONC MSQ PRESCRIBED TOTAL DOSE: 3750 CGRAY
RAD ONC MSQ PRESCRIPTION PATTERN COMMENT: NORMAL
RAD ONC MSQ START DATE: NORMAL
RAD ONC MSQ TREATMENT COURSE NUMBER: 1
RAD ONC MSQ TREATMENT SITE: NORMAL

## 2024-10-29 PROCEDURE — 77336 RADIATION PHYSICS CONSULT: CPT | Performed by: STUDENT IN AN ORGANIZED HEALTH CARE EDUCATION/TRAINING PROGRAM

## 2024-10-29 PROCEDURE — 77373 STRTCTC BDY RAD THER TX DLVR: CPT | Performed by: STUDENT IN AN ORGANIZED HEALTH CARE EDUCATION/TRAINING PROGRAM

## 2024-11-04 ENCOUNTER — OFFICE VISIT (OUTPATIENT)
Dept: UROLOGY | Facility: HOSPITAL | Age: 54
End: 2024-11-04
Payer: COMMERCIAL

## 2024-11-04 DIAGNOSIS — Z31.62 ENCOUNTER FOR FERTILITY PRESERVATION COUNSELING: ICD-10-CM

## 2024-11-04 DIAGNOSIS — R39.9 LOWER URINARY TRACT SYMPTOMS (LUTS): ICD-10-CM

## 2024-11-04 DIAGNOSIS — N52.8 OTHER MALE ERECTILE DYSFUNCTION: ICD-10-CM

## 2024-11-04 DIAGNOSIS — C61 PROSTATE CANCER (MULTI): Primary | ICD-10-CM

## 2024-11-04 PROCEDURE — 99214 OFFICE O/P EST MOD 30 MIN: CPT | Performed by: UROLOGY

## 2024-11-04 PROCEDURE — G2211 COMPLEX E/M VISIT ADD ON: HCPCS | Performed by: UROLOGY

## 2024-11-04 RX ORDER — TADALAFIL 5 MG/1
5 TABLET ORAL DAILY
Qty: 90 TABLET | Refills: 3 | Status: SHIPPED | OUTPATIENT
Start: 2024-11-04

## 2024-11-04 NOTE — PROGRESS NOTES
HPI  54 y.o. M presents today for sperm banking information, referred by Dr. Brooke.     Proc (10/17/23): TP prostate biopsy   Path: prostatic adenocarcinoma, GG3 (Radha score 4+3=7), 60% of tissue, pattern 4 (30%), GG2 (Radha score 3+4=7), GG1 (Radha score 3+3=6)       54 year old male referred by Dr. Hammer for prostate cancer, dx GG2 (Fraser 3+4=7). Hx of elevated PSA. MRI Prostate (5/8/23) showed BPH changes of the TZ, diffuse non nodular hypointensities within the PZ, without evidence of focally restricted diffusion (PI-RADS 2). S/p TP prostate biopsy (10/17/23) with pathology showing prostatic adenocarcinoma, GG3 (Radha score 4+3=7), 60% of tissue, pattern 4 (30%), GG2 (Fraser score 3+4=7), GG1 (Fraser score 3+3=6). Patient has seen radiation oncology, Dr. Lopez (12/15/23). Patient is moving forward with sperm banking. He is still considering surgery vs radiation.     Last visit 7/2/24  #Fertility Preservation: severe oligo on 1 sample, had trouble giving sample  -Sperm banking x3 - discussed doing another 2 samples, ideally in lab to ensure better collection  -if no success discussed tese, possibly at same time as spacer placement.   -Discussed effects of radiation, ejaculation, and fertility  #ED  -start daily Cialis 5mg when he starts radiation, med counseling done  #Prostate ca: discussed r/b/a of treatment options , lisandro radiation and surgery  -Leaning towards radiation, scheduled   Fuv after sperm banking     Todays Visit:  #Fertility Preservation   -3/3 sperm banking orders completed - reviewed with patient    Dr Brooke's notes reviewed  - underwent radiation     -has 1 child, 13yo, adopted  -erections are fine as of now, 9/10  -not taking Cialis at the moment    LABS: Personally reviewed and interpreted  Syphillis 4/27: Non-reactive  Hepatitis B 4/27: <3.1, Non-reactive  Hepatitis C 4/27: Non-reactive   HIV1, HIV2 4/27: Non-reactive    Component      Latest Ref Rng 6/27/2024   Volume (Semen)       1.5 mL 0.005 ! (P)   Concentration(Semen)      15 mill/mL 81.67 (P)   Total Motility (Semen)      40 % 20 ! (P)   Prog. Motility (Semen)      32 % 17 ! (P)   Non Prog. Motility (Semen)      % 4 (P)   Total No of Sperm (Semen)      39 mill 0.41 ! (P)   Total No of Motile (Semen)      mill 0.08 (P)   VOLUME CN (Post-Wash)      mL 0.40 (P)   CONCENTRATION CN (Post-Wash)      mill/mL 33.50 (P)   TOTAL MOTILITY CN (Post-Wash)      % 20 (P)   PROG. MOTILITY CN (Post-Wash)      % 12 (P)   NON PROG. MOTILITY CN (Post-Wash)      % 7 (P)   TOTAL NO OF SPERM CN (Post-Wash)      mill 13.40 (P)   TOTAL NO OF MOTILE CN (Post-wash)      mill 2.63 (P)   % Normal (Semen)      4 % 4.5 (P)   % Head defects (Semen)      % 95.0 (P)   % Neck Midpiece (Semen)      % 27.3 (P)   % Tail defects (Semen)      % 3.3 (P)   % Ex Residual Cytoplasm (Semen)      % 1.3 (P)   Tot. No of Norm. Sperm (Semen)      mill 0.018 (P)   Tot. No of Norm. Motile Sperm (Semen)      mill 0.004 (P)        Labs  Lab Results   Component Value Date    PSA 5.97 (H) 01/17/2023    PSA 4.2 (H) 01/12/2021         PMH:  Past Medical History:   Diagnosis Date    Prostate cancer (Multi)     Vision loss         PSH:  Past Surgical History:   Procedure Laterality Date    NOSE SURGERY      AGE 11    PROSTATE BIOPSY          Medications:    Current Outpatient Medications:     cholecalciferol (Vitamin D-3) 25 MCG (1000 UT) tablet, Take 1 tablet (25 mcg) by mouth once daily., Disp: , Rfl:     multivitamin tablet, Take 1 tablet by mouth once daily., Disp: , Rfl:     relugolix (Orgovyx) 120 mg tablet, Take 1 tablet (120 mg total) by mouth once daily.  If missed more than 7 days in a row, take 3 tabs before going back to 1 tab per day, Disp: 30 tablet, Rfl: 4    tadalafil (Cialis) 5 mg tablet, Take 1 tablet (5 mg) by mouth once daily., Disp: 30 tablet, Rfl: 11    Allergy:  No Known Allergies     Exam  CONSTITUTIONAL:        No acute distress    HEAD:        Normocephalic and  atraumatic    CHEST / RESPIRATORY      no excess work of breathing, no respiratory distress,    ABDOMEN / GASTROINTESTINAL:        Abdomen nondistended      Assessment/Plan  #Fertility Preservation: severe oligo on 1 sample, had trouble giving sample  -Sperm banking x3 - samples look good, over 20 vials frozen  -Discussed effects of radiation, ejaculation, and fertility   -optimized from male fertility fertility standpoint    #ED  -restart daily Cialis 5mg , med counseling done    #Prostate ca  - undergoing radiation and hormonal treatment      Fu in 1 yr with TATIANA Roque  G [id number]  Visit complexity inherent to evaluation and management (E&M) associated with medical care services that serve as the continuing focal point for all needed health care services and/or with medical care services that are part of ongoing care related to a patient's single, serious condition or a complex condition.    Scribe Attestation  By signing my name below, I, Qiana Beal, Tabithaibe, attest that this documentation  has been prepared under the direction and in the presence of Jim Molina MD.

## 2024-11-05 ENCOUNTER — SPECIALTY PHARMACY (OUTPATIENT)
Dept: PHARMACY | Facility: CLINIC | Age: 54
End: 2024-11-05

## 2024-11-05 PROCEDURE — RXMED WILLOW AMBULATORY MEDICATION CHARGE

## 2024-11-07 ENCOUNTER — PHARMACY VISIT (OUTPATIENT)
Dept: PHARMACY | Facility: CLINIC | Age: 54
End: 2024-11-07
Payer: COMMERCIAL

## 2024-11-07 NOTE — PROGRESS NOTES
RADIATION COMPLETION OF THERAPY NOTE    Patient Name:  Judson Smith Jr.  MRN:  [id number]  :  1970    Radiation Oncologist: Melissa Lopez MD PhD  Primary Care Provider: No Assigned PCP Generic Provider, MD    Brief History: Judson Smith Jr. is a 54 y.o. male with Cancer Staging   Prostate cancer (Multi)  Staging form: Prostate, AJCC 8th Edition  - Clinical stage from 10/17/2023: Stage IIC (cT1c, cN0, cM0, PSA: 6.7, Grade Group: 3) - Signed by Melissa Lopez MD PhD on 12/15/2023      The patient completed radiotherapy as outlined below.    Radiation Treatment Summary:    SBRT: Not Applicable Prostate    Treatment Period Technique Fraction Dose Fractions Total Dose   Course 1 10/15/2024-10/29/2024  (days elapsed: 14)         ProstSV 10/15/2024-10/29/2024 SBRT 750 / 750 cGy 5 / 5 3750 / 3,750 cGy     Concurrent Systemic Therapy:  Orgovyx, plan 6 months, started in 2024     CTCAE Toxicity Overview:   Toxicity Assessment          10/15/2024    11:00   Toxicity Assessment   Adverse Events Reviewed (WDL) No (Exceptions to WDL)   Treatment Site Pelvis - male   Diarrhea Grade 0   Fatigue Grade 0   Pain Grade 0   Constipation Grade 0   Proctitis Grade 0   Hematuria Grade 0   Urinary Incontinence Grade 0   Erectile Dysfunction Grade 0   Urinary Frequency Grade 0   Urinary Retention Grade 0   Urinary Urgency Grade 0     Patient Disposition: The patient will be scheduled for follow up at our clinic in 3 months with You Dolan NP with labs prior. The patient is encouraged to contact the radiation department for any questions or concerns in the interim.      Future Appointments   Date Time Provider Department Center   11/3/2025 10:00 AM [facility]

## 2024-11-25 ENCOUNTER — TELEPHONE (OUTPATIENT)
Dept: RADIATION ONCOLOGY | Facility: HOSPITAL | Age: 54
End: 2024-11-25
Payer: COMMERCIAL

## 2024-11-27 ENCOUNTER — SPECIALTY PHARMACY (OUTPATIENT)
Dept: PHARMACY | Facility: CLINIC | Age: 54
End: 2024-11-27

## 2024-11-27 NOTE — PROGRESS NOTES
[facility] Clinical Note    Judson Smith Jr. is a 54 y.o. male, who is on the specialty pharmacy service for management of:  Oncology Core.    Judson Smith Jr. is taking: Orgovyx.    Medication Receipt Date: 11/8/24  Medication Start Date (planned or actual): Established on therapy    Judson was contacted on 11/27/2024 at 10:56 AM for a virtual pharmacy visit with encounter number [id number] from:   [facility]  [address] [place] [id number]  Dept: [phone]  Dept Fax: [phone]    Judson was offered a Telemedicine Video visit or Telephone visit.  Judson consented to a telephone visit, which was performed.    The most recent encounter visit with the referring prescriber Dr. Melissa Lopez on 10/29/24 was reviewed.  Pharmacy will continue to collaborate in the care of this patient with the referring prescriber Dr. Melissa Lopez.    General Assessment      Impression/Plan  IMPRESSION/PLAN:  Is patient high risk (potential patients:  pregnancy, geriatric, pediatric)? No   Is laboratory follow-up needed? No  Is a clinical intervention needed? Yes; Pt reported experiencing a pounding pain sensation in their prostate within an hour of each dose of Orgovyx. Pt noted it typically goes away a few hours later but has noticed the intensity has increased since their radiation therapy has completed. Sent message to Dr. Lopez and clinical team.  Next reassessment date? 2/25/25  Additional comments: N/A    Refer to the encounter summary report for documentation details about patient counseling and education.      Medication Adherence    The importance of adherence was discussed with the patient and they were advised to take the medication as prescribed by their provider. Patient was encouraged to call their physician's office if they have a question regarding a missed dose.     QOL/Patient Satisfaction  Rate your quality of life on scale of  1-10: -- (N/A)  Rate your satisfaction with  Specialty Pharmacy on scale of 1-10: 8      Patient was advised to contact the pharmacy if there are any changes to their medication list, including prescriptions, OTC medications, herbal products, or supplements. Patient was advised of [facility]'s dispensing process, refill timeline, contact information ([phone]), and patient management follow up. Patient confirmed understanding of education conducted during assessment. All patient questions and concerns were addressed to the best of my ability. Patient was encouraged to contact the specialty pharmacy with any questions or concerns.    Confirmed follow-up outreaches are properly scheduled and reviewed goals of therapy with the patient.        MARILYN URIBE, MukeshD

## 2024-12-02 ENCOUNTER — TELEPHONE (OUTPATIENT)
Dept: RADIATION ONCOLOGY | Facility: HOSPITAL | Age: 54
End: 2024-12-02
Payer: COMMERCIAL

## 2024-12-02 NOTE — TELEPHONE ENCOUNTER
"Called patient in regards to message received about ongoing pain. Patent reports pain located at \"area of the prostate\" that has been ongoing fro several weeks now. States is a consistent ache and last usually when he get sup and throughout the day. Denies dysuria, hematuria or weak stream. Does report a fair amount of frequency. States did not have the pain until after RT. Has not been using any OTC.     Discussed prostatitis r/t to Rt and usage of daily IBU to help with pain as well as obtaining a UA to r/o UTI.       "

## 2024-12-03 ENCOUNTER — SPECIALTY PHARMACY (OUTPATIENT)
Dept: PHARMACY | Facility: CLINIC | Age: 54
End: 2024-12-03

## 2024-12-03 PROCEDURE — RXMED WILLOW AMBULATORY MEDICATION CHARGE

## 2024-12-06 DIAGNOSIS — R30.0 DYSURIA: Primary | ICD-10-CM

## 2024-12-10 ENCOUNTER — PHARMACY VISIT (OUTPATIENT)
Dept: PHARMACY | Facility: CLINIC | Age: 54
End: 2024-12-10
Payer: COMMERCIAL

## 2024-12-16 ENCOUNTER — TELEPHONE (OUTPATIENT)
Dept: RADIATION ONCOLOGY | Facility: HOSPITAL | Age: 54
End: 2024-12-16
Payer: COMMERCIAL

## 2025-01-03 PROCEDURE — RXMED WILLOW AMBULATORY MEDICATION CHARGE

## 2025-01-06 ENCOUNTER — SPECIALTY PHARMACY (OUTPATIENT)
Dept: PHARMACY | Facility: CLINIC | Age: 55
End: 2025-01-06

## 2025-01-08 ENCOUNTER — PHARMACY VISIT (OUTPATIENT)
Dept: PHARMACY | Facility: CLINIC | Age: 55
End: 2025-01-08
Payer: COMMERCIAL

## 2025-01-09 ENCOUNTER — TELEPHONE (OUTPATIENT)
Dept: RADIATION ONCOLOGY | Facility: HOSPITAL | Age: 55
End: 2025-01-09
Payer: COMMERCIAL

## 2025-01-10 ENCOUNTER — HOSPITAL ENCOUNTER (OUTPATIENT)
Dept: RADIATION ONCOLOGY | Facility: HOSPITAL | Age: 55
Setting detail: RADIATION/ONCOLOGY SERIES
Discharge: HOME | End: 2025-01-10
Payer: COMMERCIAL

## 2025-01-10 ENCOUNTER — LAB (OUTPATIENT)
Dept: LAB | Facility: HOSPITAL | Age: 55
End: 2025-01-10
Payer: COMMERCIAL

## 2025-01-10 VITALS
RESPIRATION RATE: 18 BRPM | BODY MASS INDEX: 27.86 KG/M2 | SYSTOLIC BLOOD PRESSURE: 117 MMHG | HEART RATE: 75 BPM | OXYGEN SATURATION: 99 % | DIASTOLIC BLOOD PRESSURE: 74 MMHG | WEIGHT: 217.1 LBS | TEMPERATURE: 97 F

## 2025-01-10 DIAGNOSIS — C61 PROSTATE CANCER (MULTI): ICD-10-CM

## 2025-01-10 DIAGNOSIS — C61 PROSTATE CANCER (MULTI): Primary | ICD-10-CM

## 2025-01-10 DIAGNOSIS — R30.0 DYSURIA: ICD-10-CM

## 2025-01-10 LAB
APPEARANCE UR: CLEAR
BILIRUB UR STRIP.AUTO-MCNC: NEGATIVE MG/DL
COLOR UR: NORMAL
GLUCOSE UR STRIP.AUTO-MCNC: NORMAL MG/DL
HOLD SPECIMEN: NORMAL
HOLD SPECIMEN: NORMAL
KETONES UR STRIP.AUTO-MCNC: NEGATIVE MG/DL
LEUKOCYTE ESTERASE UR QL STRIP.AUTO: NEGATIVE
NITRITE UR QL STRIP.AUTO: NEGATIVE
PH UR STRIP.AUTO: 6 [PH]
PROT UR STRIP.AUTO-MCNC: NEGATIVE MG/DL
PSA SERPL-MCNC: 0.15 NG/ML
RBC # UR STRIP.AUTO: NEGATIVE /UL
SP GR UR STRIP.AUTO: 1.02
TESTOST SERPL-MCNC: <30 NG/DL (ref 240–1000)
UROBILINOGEN UR STRIP.AUTO-MCNC: NORMAL MG/DL

## 2025-01-10 PROCEDURE — 84403 ASSAY OF TOTAL TESTOSTERONE: CPT

## 2025-01-10 PROCEDURE — 84153 ASSAY OF PSA TOTAL: CPT

## 2025-01-10 PROCEDURE — 99213 OFFICE O/P EST LOW 20 MIN: CPT | Performed by: STUDENT IN AN ORGANIZED HEALTH CARE EDUCATION/TRAINING PROGRAM

## 2025-01-10 PROCEDURE — 36415 COLL VENOUS BLD VENIPUNCTURE: CPT

## 2025-01-10 PROCEDURE — 81003 URINALYSIS AUTO W/O SCOPE: CPT

## 2025-01-10 ASSESSMENT — ENCOUNTER SYMPTOMS
LIGHT-HEADEDNESS: 1
FATIGUE: 1
EYES NEGATIVE: 1
HEMATOLOGIC/LYMPHATIC NEGATIVE: 1
MUSCULOSKELETAL NEGATIVE: 1
HOT FLASHES: 1
PALPITATIONS: 1
DIZZINESS: 1
RESPIRATORY NEGATIVE: 1

## 2025-01-10 ASSESSMENT — PAIN SCALES - GENERAL: PAINLEVEL_OUTOF10: 0-NO PAIN

## 2025-01-10 NOTE — PROGRESS NOTES
Radiation Oncology Nursing Note    IPSS (International Prostate Symptom Score):   0 / 35, bother 0   - He is not experiencing urinary incontinence.  Recently stopped taking Cialis about 1/7 due to palpitations, which has improved since stopping Cialis.    - He is not experiencing dysuria, hematuria, flank pain.     Sexual function:   - Quality of erections during the last 4 weeks: 1 = None at all  - Use of erectile dysfunction medications:  None    Bowel function:    - Reports hematochezia and has minor pain/discomfort with bowel movements.    - Frequent BM's x 3-4 per day, soft to loose for about 2 months.   - Blood in stool once a day over [place] and New Years, has cleared up since then.     Current Systemic Treatment:  Yes, describe: Orgovyx      Pain: The patient's current pain level was assessed.  They report currently having a pain of 0 out of 10.  They feel their pain is under control without the use of pain medications.    Review of Systems:  Review of Systems   Constitutional:  Positive for fatigue.   HENT:  Negative.     Eyes: Negative.    Respiratory: Negative.     Cardiovascular:  Positive for palpitations (with starting Cilalis - stopped Cialis due to this).   Endocrine: Positive for hot flashes (currently on Orgovyx).   Genitourinary: Negative.     Musculoskeletal: Negative.    Skin: Negative.    Neurological:  Positive for dizziness (at times) and light-headedness (at times - comes and goes).   Hematological: Negative.

## 2025-01-10 NOTE — ADDENDUM NOTE
Encounter addended by: Melissa Lopez MD PhD on: 1/10/2025 10:50 AM   Actions taken: Order list changed, Diagnosis association updated

## 2025-01-10 NOTE — PROGRESS NOTES
Staff Physician: Melissa Lopez MD PhD  Date of Service: 1/10/2025  Patient name: Judson Smith Jr.   MRN: [id number]    RADIATION ONCOLOGY FOLLOW UP NOTE    IDENTIFYING DATA:  DIAGNOSIS: Newly diagnosed, localized   Cancer Staging   Prostate cancer (Multi)  Staging form: Prostate, AJCC 8th Edition  - Clinical stage from 10/17/2023: Stage IIC (cT1c, cN0, cM0, PSA: 6.7, Grade Group: 3) - Signed by Melissa Lopez MD PhD on 1/10/2025  Prognostic indicators: Decipher 0.41 (low)    DISEASE STATE: Prior treatment complete, no active treatment  DISEASE STATUS: Controlled  Problem List Items Addressed This Visit       Prostate cancer (Multi) - Primary    Relevant Orders    Prostate Specific Antigen, Screen    Testosterone       He was last seen in Radiation Oncology on 10/29/2024 at the completion of RT, and returns today for routine follow-up.    Oncologic History:  5/8/23 MRI prostate w/wo contrast: BPH changes of the TZ. Diffuse non nodular hypointensities within the PZ, without evidence of focally restricted diffusion (PI-RADS 2).    7/28/2023 PSA 6.71     9/26/23 PSMA PET: showed diffuse heterogeneous increased tracer uptake of max SUV 11.8 consistent with known neoplasm. No SVI. Negative for estrella or distant metastasis.     10/17/23 TP prostate biopsy: 7/18 systematic cores involved with prostatic adenocarcinoma, GG3. Decipher 0.41     4/27/2024  PSA 7.9     08/2024 started Orgovyx, plans for 6m    10/15/2024 - 10/29/2024 completed definitive radiation   SBRT: Not Applicable Prostate (Resolved)    Treatment Period Technique Fraction Dose Fractions Total Dose   Course 1 10/15/2024-10/29/2024  (days elapsed: 14)         ProstSV 10/15/2024-10/29/2024 SBRT 750 / 750 cGy 5 / 5 3750 / 3,750 cGy       Interval History:  He is here today by himself.  IPSS of 0 reports urination has been no problem.  Reports that over [place] and New Year's when he was visiting family in the self, he noted blood in his stool daily.   Since he has come back to [place], this has cleared up, the last instance of blood in stool was about a week and a half ago.  He denies any history of hemorrhoids.  His last Cologuard was in  and it was negative he has never had a colonoscopy before.  He thinks the blood in the stool could possibly be related to eating chicken neck bones.  Stopped cialis 5mg because of palpitations.   Intermittent hot flashes that are tolerable.  Unintentional weight gain or loss: none  Pain score: 0/10     A 10 point review of systems was reviewed with pertinent positives and negatives noted in HPI. All other systems have been reviewed and are negative.    PERFORMANCE STATUS:  Karnofsky Performance Score/ECO, Fully active, able to carry on all pre-disease performed without restriction (ECOG equivalent 0)    PHYSICAL EXAMINATION:  /74   Pulse 75   Temp 36.1 °C (97 °F) (Temporal)   Resp 18   Wt 98.5 kg (217 lb 1.6 oz)   SpO2 99%   BMI 27.86 kg/m²   Constitutional: well developed, no distress, alert & oriented, cooperative  Eyes: pupils equal round and reactive to light, extraocular movements intact  Respiratory: normal work of breathing  Psychological: normal affect    CTCAE (v5) ADVERSE EVENTS:  Toxicity Assessment          1/10/2025    10:00   Toxicity Assessment   Treatment Site Prostate RT   Diarrhea Grade 0   Proctitis Grade 0   Hematuria Grade 0   Urinary Incontinence Grade 0   Erectile Dysfunction Grade 2   Urinary Frequency Grade 0   Urinary Retention Grade 0   Urinary Urgency Grade 0   Hot Flashes Grade 1       DIAGNOSTIC REPORTS REVIEWED:  Imaging: All imaging was personally reviewed and interpreted in clinic. Findings as per interval history and EMR.  Laboratory/Pathology:  All pertinent labs and pathology were personally reviewed and interpreted in clinic. Findings as per interval history and EMR.  Lab Results   Component Value Date    PSA 5.97 (H) 2023    PSA 4.2 (H) 2021     No  "results found for: \"TESTOSTERONE\", \"TESTOTOTMS\"  Lab Results   Component Value Date    BUN 19 08/21/2024    CREATININE 1.50 (H) 08/21/2024    EGFR 55 (L) 08/21/2024     08/21/2024    K 4.5 08/21/2024     08/21/2024    CO2 27 08/21/2024    CALCIUM 9.7 08/21/2024    HGBA1C 5.1 04/27/2024        IMPRESSION:  54 year old man with unfavorable intermediate risk prostate cancer, (iPSA 6.7, GG3 7/18 cores+), Decipher 0.41, cN0M0 by PSMA PET, treated with Orgovyx (started 08/2024, plan for 6 months) and definitive radiation (37.5Gy in 5fx to prostate, completed 10/29/2024).    He has recovered well from side effects of radiation, with no urinary issues. Had blood in stool over the holidays that he thought may be attributed to diet, has resolved on its own.     PLAN:  -labs today  -finish total of 6m of Orgovyx  -return to clinic in 4 month's time with repeat PSA and testosterone at that time.   -colonoscopy plan for 11/2025 (a year after RT), last cologuard in 2022 was negative     He knows to call with any questions or concerns in the interim.    Melissa Lopez MD PhD  , Radiation Oncology  "

## 2025-01-11 LAB — HOLD SPECIMEN: NORMAL

## 2025-02-04 ENCOUNTER — APPOINTMENT (OUTPATIENT)
Dept: RADIATION ONCOLOGY | Facility: HOSPITAL | Age: 55
End: 2025-02-04
Payer: COMMERCIAL

## 2025-02-04 DIAGNOSIS — C61 PROSTATE CANCER (MULTI): ICD-10-CM

## 2025-02-04 RX ORDER — RELUGOLIX 120 MG/1
120 TABLET, FILM COATED ORAL DAILY
Qty: 30 TABLET | Refills: 4 | Status: CANCELLED | OUTPATIENT
Start: 2025-02-04 | End: 2025-07-04

## 2025-02-27 DIAGNOSIS — C61 MALIGNANT NEOPLASM OF PROSTATE (MULTI): Primary | ICD-10-CM

## 2025-02-28 ENCOUNTER — SPECIALTY PHARMACY (OUTPATIENT)
Dept: PHARMACY | Facility: CLINIC | Age: 55
End: 2025-02-28

## 2025-02-28 PROCEDURE — RXMED WILLOW AMBULATORY MEDICATION CHARGE

## 2025-03-01 ENCOUNTER — PHARMACY VISIT (OUTPATIENT)
Dept: PHARMACY | Facility: CLINIC | Age: 55
End: 2025-03-01
Payer: COMMERCIAL

## 2025-04-11 ENCOUNTER — TELEPHONE (OUTPATIENT)
Dept: RADIATION ONCOLOGY | Facility: HOSPITAL | Age: 55
End: 2025-04-11
Payer: COMMERCIAL

## 2025-04-11 NOTE — TELEPHONE ENCOUNTER
Called pt. to remind of appointment on 4/14/2025 at  1:30 pm with TATIANA Dolan. Pt's phone went to voicemail left number if needs to reschedule.

## 2025-04-14 ENCOUNTER — HOSPITAL ENCOUNTER (OUTPATIENT)
Dept: RADIATION ONCOLOGY | Facility: HOSPITAL | Age: 55
Setting detail: RADIATION/ONCOLOGY SERIES
Discharge: HOME | End: 2025-04-14
Payer: COMMERCIAL

## 2025-04-14 DIAGNOSIS — C61 PROSTATE CANCER (MULTI): ICD-10-CM

## 2025-04-14 DIAGNOSIS — C61 MALIGNANT NEOPLASM OF PROSTATE (MULTI): Primary | ICD-10-CM

## 2025-04-14 PROCEDURE — 99214 OFFICE O/P EST MOD 30 MIN: CPT | Mod: 95

## 2025-04-14 PROCEDURE — 99214 OFFICE O/P EST MOD 30 MIN: CPT

## 2025-04-14 ASSESSMENT — ENCOUNTER SYMPTOMS
WEAKNESS: 0
PSYCHIATRIC NEGATIVE: 1
SHORTNESS OF BREATH: 0
DIARRHEA: 0
ANAL BLEEDING: 0
FREQUENCY: 0
DIFFICULTY URINATING: 0
RECTAL PAIN: 0
CHEST TIGHTNESS: 0
ABDOMINAL PAIN: 0
BACK PAIN: 0
ARTHRALGIAS: 0
DYSURIA: 0
FATIGUE: 0
PALPITATIONS: 0
CONSTIPATION: 0
DIZZINESS: 0
HEMATURIA: 0
UNEXPECTED WEIGHT CHANGE: 0
BLOOD IN STOOL: 0
FEVER: 0
JOINT SWELLING: 0
COUGH: 0
ALLERGIC/IMMUNOLOGIC NEGATIVE: 1

## 2025-04-14 NOTE — PROGRESS NOTES
Cancer synopsis:  Rad/onc: Dr. Lopez/[facility]    53 y/o male w/ Stage IIC (cT1c, cN0, cM0, PSA: 6.7, Grade Group: 3) Decipher 0.41, cN0M0 by PSMA PET     Prostate ca hx:  5/8/23 MRI prostate w/wo contrast: BPH changes of the TZ. Diffuse non nodular hypointensities within the PZ, without evidence of focally restricted diffusion (PI-RADS 2).     9/26/23 PSMA PET: showed diffuse heterogeneous increased tracer uptake of max SUV 11.8 consistent with known neoplasm. No SVI. Negative for estrella or distant metastasis.     10/17/23 TP prostate biopsy: 7/18 systematic cores involved with prostatic adenocarcinoma, GG3.     08/2024 Started orgovyx 6m    10/29/2024: SBRT to prostate    History of presenting illness:    Patient ID: [id number]     Judson Smith Jr. is a 54 y.o. male who presents for his UIR prostate cancer (cT1c, cN0, cM0, PSA: 6.7, Grade Group: 3) Decipher 0.41, cN0M0 by PSMA PET now s/p RT and completed st term orgovyx.    RT Site: prostate  RT Date: 10/2024  Hormone therapy: Yes, completed 6m orgovyx  Hot Flushes: Admits to hot flushes but states are manageable and is now done w/ orgovyx as of last week.  Fatigue: Denies  Bone pain: Denies  ED: Admits to loss w/ orgovyx. States has been able to obtain partial erection at times.  - Quality of erections during the last 4 weeks: 2 = Not firm enough for any sexual activity  - Use of erectile dysfunction medications:  None  IPSS: N/a phone/virtual  Urinary symptoms: Denies leakage, dysuria, hematuria, urgency or frequency.  Urinary Medications: No  Colonoscopy: cologaurd 2023 negative (nxt 2026)  Rectal bleeding: Denies further rectal bleeding. States BMs have returned to normal  Other systems: Denies SOB, CP or fever    Review of systems:  Review of Systems   Constitutional:  Negative for fatigue, fever and unexpected weight change.   Respiratory:  Negative for cough, chest tightness and shortness of breath.    Cardiovascular:  Negative for chest pain,  palpitations and leg swelling.   Gastrointestinal:  Negative for abdominal pain, anal bleeding, blood in stool, constipation, diarrhea and rectal pain.   Endocrine: Negative for cold intolerance, heat intolerance and polyuria.   Genitourinary:  Negative for decreased urine volume, difficulty urinating, dysuria, frequency, hematuria and urgency.   Musculoskeletal:  Negative for arthralgias, back pain, gait problem and joint swelling.   Skin: Negative.    Allergic/Immunologic: Negative.    Neurological:  Negative for dizziness, syncope and weakness.   Psychiatric/Behavioral: Negative.       PERFORMANCE STATUS:  KPS/ECO, Fully active, able to carry on all pre-disease performed without restriction (ECOG equivalent 0)    Past Medical history  Past Medical History:   Diagnosis Date    Elevated PSA 2022.    Prostate cancer (Multi)     STD (sexually transmitted disease) Looked at results and it stated I had one.    Vision loss       Surgical/family history  Family History   Problem Relation Name Age of Onset    COPD Mother SOY     Other (VASOVAGAL) Mother SOY     Atrial fibrillation Mother SOY     Cancer Father JOE LEAL     Prostate cancer Father JOE LEAL       Past Surgical History:   Procedure Laterality Date    NOSE SURGERY      AGE 11    PROSTATE BIOPSY        Social History  Tobacco Use: Medium Risk (10/15/2024)    Patient History     Smoking Tobacco Use: Passive Smoke Exposure - Never Smoker     Smokeless Tobacco Use: Never     Passive Exposure: Yes       Current med list:  Current Outpatient Medications   Medication Instructions    cholecalciferol (Vitamin D-3) 25 MCG (1000 UT) tablet 1 tablet, Daily    multivitamin tablet 1 tablet, Daily    tadalafil (CIALIS) 5 mg, oral, Daily      Last recorded vital:  N/a phone/virtual    Physical exam  N/a phone/virtual    Pertinent labs:  PSA   Date/Time Value Ref Range Status   2023 11:25 AM 5.97 (H) 0.00 - 4.00 ng/mL Final     Comment:      The FDA requires that the method used for PSA assay be   reported to the physician. Values obtained with different   assay methods must not be used interchangeably. This test   was performed at [facility] using the Siemens  HyginexllPolitapoll PSA method, which is a sandwich immunoassay using   chemiluminescence for quantitation. The assay is approved  for measurement of prostate-specific antigen (PSA) in   serum and may be used in conjunction with a digital rectal  examination in men 50 years and older as an aid in   detection of prostate cancer.   5-Alpha-reductase inhibitors (e.g. Proscar, Finasteride,   Avodart, Dutasteride and Fatoumata) for the treatment of BPH   have been shown to lower PSA levels by an average of 50%   after 6 months of treatment.     01/12/2021 11:22 AM 4.2 (H) 0.0 - 4.0 ng/mL Final     Comment:     INTERPRETIVE INFORMATION: Prostate Specific Antigen  The Roche PSA electrochemiluminescent immunoassay was used.   Results obtained with different test methods or kits cannot be   used interchangeably. The Roche PSA method is approved for use as   an aid in the detection of prostate cancer when used in   conjunction with a digital rectal exam in men age 50 and older.   The Roche PSA method is also indicated for the serial measurement   of PSA to aid in the prognosis and management of prostate cancer   patients. Elevated PSA concentrations can only suggest the   presence of prostate cancer until biopsy is performed. PSA   concentrations can also be elevated in benign prostatic   hyperplasia or inflammatory conditions of the prostate. PSA is   generally not elevated in healthy men or men with non-prostatic   carcinoma.       Testosterone   Date/Time Value Ref Range Status   01/10/2025 11:01 AM <30 (L) 240 - 1,000 ng/dL Final     Comment:     Testing by a more sensitive method (Testosterone Total LC-MS/MS)is recommended for accurate quantification of Testosterone levels less than 60 ng/dL.       Dx:  Problem List Items Addressed This Visit       Prostate cancer (Multi)    Relevant Orders    Clinic Appointment Request Follow Up; YOU ANG; [facility] [address] [employer] (Completed)   Discussed having PSA and testosterone drawn in 2-3 wks now having completed orgovyx will ideally see testosterone returned at that time point w/ low PSA, will call with results. Review of latent SE including rectal bleeding, hematuria, urinary strictures, ED where reviewed as well as how to contact office if s/s present. Denies latent SE. NCCN guidelines where reviewed and routine FUV of every 3m for first year and every 6m for four years for a total of five years was discussed. Patient verbalized understanding.     PLAN:  FUV 3m  Labs Psa and testosterone today and in 3m  Imaging none  FUV other providers: PCP for routine evals    Please contact office with any concerns:  [facility]  [phone]    I performed this visit using realtime telehealth tools, including an audio/video OR telephone connection between patient’s name and location Judson Smith and You [facility].    2. POS 10: Telehealth provided in patient's home.  o Patient is located in their home (which is a location other than a hospital or other  facility where the patient receives care in a private residence) when receiving  health services or health related services through telecommunication technology.

## 2025-05-21 ENCOUNTER — PATIENT MESSAGE (OUTPATIENT)
Dept: RADIATION ONCOLOGY | Facility: HOSPITAL | Age: 55
End: 2025-05-21
Payer: COMMERCIAL

## 2025-05-21 DIAGNOSIS — R30.0 DYSURIA: Primary | ICD-10-CM

## 2025-05-24 ENCOUNTER — LAB (OUTPATIENT)
Dept: LAB | Facility: HOSPITAL | Age: 55
End: 2025-05-24
Payer: COMMERCIAL

## 2025-05-24 DIAGNOSIS — C61 MALIGNANT NEOPLASM OF PROSTATE (MULTI): Primary | ICD-10-CM

## 2025-05-24 DIAGNOSIS — C61 PROSTATE CANCER (MULTI): ICD-10-CM

## 2025-05-24 LAB
PSA SERPL-MCNC: 0.54 NG/ML
TESTOST SERPL-MCNC: 496 NG/DL (ref 240–1000)

## 2025-05-24 PROCEDURE — 84403 ASSAY OF TOTAL TESTOSTERONE: CPT

## 2025-05-24 PROCEDURE — 84153 ASSAY OF PSA TOTAL: CPT

## 2025-05-28 DIAGNOSIS — Z01.89 PATIENT REQUEST FOR DIAGNOSTIC TESTING: Primary | ICD-10-CM

## 2025-06-17 ENCOUNTER — TELEPHONE (OUTPATIENT)
Dept: RADIATION ONCOLOGY | Facility: HOSPITAL | Age: 55
End: 2025-06-17
Payer: COMMERCIAL

## 2025-06-17 ASSESSMENT — ENCOUNTER SYMPTOMS
BACK PAIN: 0
FEVER: 0
PALPITATIONS: 0
RECTAL PAIN: 0
FATIGUE: 0
DIFFICULTY URINATING: 0
WEAKNESS: 0
PSYCHIATRIC NEGATIVE: 1
FREQUENCY: 0
HEMATURIA: 0
CONSTIPATION: 0
DIZZINESS: 0
COUGH: 0
CHEST TIGHTNESS: 0
BLOOD IN STOOL: 0
ANAL BLEEDING: 0
DIARRHEA: 0
UNEXPECTED WEIGHT CHANGE: 0
ALLERGIC/IMMUNOLOGIC NEGATIVE: 1
DYSURIA: 0
ABDOMINAL PAIN: 0
ARTHRALGIAS: 0
SHORTNESS OF BREATH: 0
JOINT SWELLING: 0

## 2025-06-17 NOTE — PROGRESS NOTES
Cancer synopsis:  Rad/onc: Dr. Lopez/[facility]    53 y/o male w/ Stage IIC (cT1c, cN0, cM0, PSA: 6.7, Grade Group: 3) Decipher 0.41, cN0M0 by PSMA PET     Prostate ca hx:  5/8/23 MRI prostate w/wo contrast: BPH changes of the TZ. Diffuse non nodular hypointensities within the PZ, without evidence of focally restricted diffusion (PI-RADS 2).     9/26/23 PSMA PET: showed diffuse heterogeneous increased tracer uptake of max SUV 11.8 consistent with known neoplasm. No SVI. Negative for estrella or distant metastasis.     10/17/23 TP prostate biopsy: 7/18 systematic cores involved with prostatic adenocarcinoma, GG3.     08/2024 Started orgovyx 6m    10/29/2024: SBRT to prostate    History of presenting illness:    Patient ID: [id number]     Judson Smith Jr. is a 55 y.o. male who presents for his UIR prostate cancer (cT1c, cN0, cM0, PSA: 6.7, Grade Group: 3) Decipher 0.41, cN0M0 by PSMA PET now s/p RT and completed st term orgovyx.    RT Site: prostate  RT Date: 10/2024  Hormone therapy: Yes, completed 6m orgovyx  Hot Flushes: Denies.  Fatigue: Patient reports persuasive fatigue that has been ongoing for the last month or two. States worsened significantly when increasing physical activity. States no SOB, CP or fever. States did improve with rest. Is requesting a few days off from work in order to recover from pervasive fatigue. Denies B sx.   Bone pain: Denies  ED: Admits to difficulty with libido since being off or orgovyx  - Quality of erections during the last 4 weeks: 2 = Not firm enough for any sexual activity  - Use of erectile dysfunction medications:  None  IPSS: 1  Urinary symptoms: Denies leakage, dysuria, hematuria, urgency or frequency.  Urinary Medications: No  Colonoscopy: cologaurd 2023 negative (nxt 2026)  Rectal bleeding: Denies further rectal bleeding. States BMs have returned to normal  Other systems: Denies SOB, CP or fever    Review of systems:  Review of Systems   Constitutional:  Negative for  fatigue, fever and unexpected weight change.   Respiratory:  Negative for cough, chest tightness and shortness of breath.    Cardiovascular:  Negative for chest pain, palpitations and leg swelling.   Gastrointestinal:  Negative for abdominal pain, anal bleeding, blood in stool, constipation, diarrhea and rectal pain.   Endocrine: Negative for cold intolerance, heat intolerance and polyuria.   Genitourinary:  Negative for decreased urine volume, difficulty urinating, dysuria, frequency, hematuria and urgency.   Musculoskeletal:  Negative for arthralgias, back pain, gait problem and joint swelling.   Skin: Negative.    Allergic/Immunologic: Negative.    Neurological:  Negative for dizziness, syncope and weakness.   Psychiatric/Behavioral: Negative.       PERFORMANCE STATUS:  KPS/ECO, Fully active, able to carry on all pre-disease performed without restriction (ECOG equivalent 0)    Past Medical history  Past Medical History:   Diagnosis Date    Elevated PSA 2022.    Prostate cancer (Multi)     STD (sexually transmitted disease) Looked at results and it stated I had one.    Vision loss       Surgical/family history  Family History   Problem Relation Name Age of Onset    COPD Mother SOY     Other (VASOVAGAL) Mother SOY     Atrial fibrillation Mother SOY     Cancer Father JOE LEAL     Prostate cancer Father JOE LEAL       Past Surgical History:   Procedure Laterality Date    NOSE SURGERY      AGE 11    PROSTATE BIOPSY        Social History  Tobacco Use: Medium Risk (10/15/2024)    Patient History     Smoking Tobacco Use: Passive Smoke Exposure - Never Smoker     Smokeless Tobacco Use: Never     Passive Exposure: Yes       Current med list:  Current Outpatient Medications   Medication Instructions    cholecalciferol (Vitamin D-3) 25 MCG (1000 UT) tablet 1 tablet, Daily    multivitamin tablet 1 tablet, Daily      Last recorded vital:  N/a phone/virtual    Physical exam  N/a  phone/virtual    Pertinent labs:  PSA   Date/Time Value Ref Range Status   01/17/2023 11:25 AM 5.97 (H) 0.00 - 4.00 ng/mL Final     Comment:     The FDA requires that the method used for PSA assay be   reported to the physician. Values obtained with different   assay methods must not be used interchangeably. This test   was performed at [facility] using the Siemens  Atellica PSA method, which is a sandwich immunoassay using   chemiluminescence for quantitation. The assay is approved  for measurement of prostate-specific antigen (PSA) in   serum and may be used in conjunction with a digital rectal  examination in men 50 years and older as an aid in   detection of prostate cancer.   5-Alpha-reductase inhibitors (e.g. Proscar, Finasteride,   Avodart, Dutasteride and Fatoumata) for the treatment of BPH   have been shown to lower PSA levels by an average of 50%   after 6 months of treatment.     01/12/2021 11:22 AM 4.2 (H) 0.0 - 4.0 ng/mL Final     Comment:     INTERPRETIVE INFORMATION: Prostate Specific Antigen  The Roche PSA electrochemiluminescent immunoassay was used.   Results obtained with different test methods or kits cannot be   used interchangeably. The Roche PSA method is approved for use as   an aid in the detection of prostate cancer when used in   conjunction with a digital rectal exam in men age 50 and older.   The Roche PSA method is also indicated for the serial measurement   of PSA to aid in the prognosis and management of prostate cancer   patients. Elevated PSA concentrations can only suggest the   presence of prostate cancer until biopsy is performed. PSA   concentrations can also be elevated in benign prostatic   hyperplasia or inflammatory conditions of the prostate. PSA is   generally not elevated in healthy men or men with non-prostatic   carcinoma.       Testosterone   Date/Time Value Ref Range Status   05/24/2025 10:44  240 - 1,000 ng/dL Final      Dx:  Problem List Items  Addressed This Visit    None  Visit Diagnoses         Malignant neoplasm of prostate (Multi)    -  Primary    Relevant Orders    Clinic Appointment Request Follow Up; YOU ANG (virtual); [facility] [address] [facility] (virtual) (Completed)        PSA 0.54 rise from 0.15 however given return of testosterone expected and will continue to trend 3m to ensure stabilization. Review of latent SE including rectal bleeding, hematuria, urinary strictures, ED where reviewed as well as how to contact office if s/s present. Denies latent SE. NCCN guidelines where reviewed and routine FUV of every 3m for first year and every 6m for four years for a total of five years was discussed. Patient verbalized understanding.     Fatigue:  Pt reports increased fatigue since resuming normal physical activity after hormonal therapy. Discussed RT fatigue in patients typically lasting 2-3m at most however recovery from hormonal therapy that can be prolonged d/t muscle wasting. Discussed with patient other various causes of significant fatigue including anemia, thyroid disorders, and other malignancies will obtain routine labs today to assess function. Request a short period of leave from work over 5 days, at this time I will brittany this with lab completion to ensure all causes have been r/o.    PLAN:  FUV 3m  Labs Psa and testosterone today and in 3m  Imaging none  FUV other providers: PCP for routine evals    Please contact office with any concerns:  You [facility]una [facility]  [phone]    I performed this visit using realtime telehealth tools, including an audio/video OR telephone connection between patient’s name and location Judson Smith and You Ang [facility].    2. POS 10: Telehealth provided in patient's home.  o Patient is located in their home (which is a location other than a hospital or other  facility where the patient receives care in a private residence) when receiving  health services or health related services through  [employer] technology.

## 2025-06-18 ENCOUNTER — HOSPITAL ENCOUNTER (OUTPATIENT)
Dept: RADIATION ONCOLOGY | Facility: HOSPITAL | Age: 55
Setting detail: RADIATION/ONCOLOGY SERIES
Discharge: HOME | End: 2025-06-18
Payer: COMMERCIAL

## 2025-06-18 VITALS
HEART RATE: 66 BPM | WEIGHT: 227.7 LBS | OXYGEN SATURATION: 96 % | TEMPERATURE: 97.3 F | DIASTOLIC BLOOD PRESSURE: 72 MMHG | BODY MASS INDEX: 29.22 KG/M2 | SYSTOLIC BLOOD PRESSURE: 114 MMHG | RESPIRATION RATE: 18 BRPM

## 2025-06-18 DIAGNOSIS — T73.3XXA FATIGUE DUE TO EXCESSIVE EXERTION, INITIAL ENCOUNTER: ICD-10-CM

## 2025-06-18 DIAGNOSIS — C61 MALIGNANT NEOPLASM OF PROSTATE (MULTI): Primary | ICD-10-CM

## 2025-06-18 PROCEDURE — 99214 OFFICE O/P EST MOD 30 MIN: CPT

## 2025-06-18 ASSESSMENT — COLUMBIA-SUICIDE SEVERITY RATING SCALE - C-SSRS
1. IN THE PAST MONTH, HAVE YOU WISHED YOU WERE DEAD OR WISHED YOU COULD GO TO SLEEP AND NOT WAKE UP?: NO
6. HAVE YOU EVER DONE ANYTHING, STARTED TO DO ANYTHING, OR PREPARED TO DO ANYTHING TO END YOUR LIFE?: NO
2. HAVE YOU ACTUALLY HAD ANY THOUGHTS OF KILLING YOURSELF?: NO

## 2025-06-18 ASSESSMENT — PATIENT HEALTH QUESTIONNAIRE - PHQ9
2. FEELING DOWN, DEPRESSED OR HOPELESS: NOT AT ALL
1. LITTLE INTEREST OR PLEASURE IN DOING THINGS: NOT AT ALL
SUM OF ALL RESPONSES TO PHQ9 QUESTIONS 1 AND 2: 0

## 2025-06-18 ASSESSMENT — ENCOUNTER SYMPTOMS
OCCASIONAL FEELINGS OF UNSTEADINESS: 0
LOSS OF SENSATION IN FEET: 0
DEPRESSION: 0

## 2025-06-18 ASSESSMENT — PAIN SCALES - GENERAL: PAINLEVEL_OUTOF10: 5

## 2025-06-19 ENCOUNTER — LAB (OUTPATIENT)
Dept: LAB | Facility: HOSPITAL | Age: 55
End: 2025-06-19
Payer: COMMERCIAL

## 2025-06-19 DIAGNOSIS — C61 PROSTATE CANCER (MULTI): ICD-10-CM

## 2025-06-19 DIAGNOSIS — R30.0 DYSURIA: ICD-10-CM

## 2025-06-19 DIAGNOSIS — Z01.89 PATIENT REQUEST FOR DIAGNOSTIC TESTING: ICD-10-CM

## 2025-06-19 DIAGNOSIS — T73.3XXA FATIGUE DUE TO EXCESSIVE EXERTION, INITIAL ENCOUNTER: ICD-10-CM

## 2025-06-19 LAB
APPEARANCE UR: CLEAR
BASOPHILS # BLD AUTO: 0.03 X10*3/UL (ref 0–0.1)
BASOPHILS NFR BLD AUTO: 0.9 %
BILIRUB UR STRIP.AUTO-MCNC: NEGATIVE MG/DL
COLOR UR: COLORLESS
EOSINOPHIL # BLD AUTO: 0.05 X10*3/UL (ref 0–0.7)
EOSINOPHIL NFR BLD AUTO: 1.4 %
ERYTHROCYTE [DISTWIDTH] IN BLOOD BY AUTOMATED COUNT: 12.1 % (ref 11.5–14.5)
GLUCOSE UR STRIP.AUTO-MCNC: NORMAL MG/DL
HCT VFR BLD AUTO: 37.1 % (ref 41–52)
HGB BLD-MCNC: 12.1 G/DL (ref 13.5–17.5)
IMM GRANULOCYTES # BLD AUTO: 0 X10*3/UL (ref 0–0.7)
IMM GRANULOCYTES NFR BLD AUTO: 0 % (ref 0–0.9)
IRON SATN MFR SERPL: 29 % (ref 25–45)
IRON SERPL-MCNC: 114 UG/DL (ref 35–150)
KETONES UR STRIP.AUTO-MCNC: NEGATIVE MG/DL
LEUKOCYTE ESTERASE UR QL STRIP.AUTO: NEGATIVE
LYMPHOCYTES # BLD AUTO: 1.64 X10*3/UL (ref 1.2–4.8)
LYMPHOCYTES NFR BLD AUTO: 47 %
MCH RBC QN AUTO: 31.3 PG (ref 26–34)
MCHC RBC AUTO-ENTMCNC: 32.6 G/DL (ref 32–36)
MCV RBC AUTO: 96 FL (ref 80–100)
MONOCYTES # BLD AUTO: 0.44 X10*3/UL (ref 0.1–1)
MONOCYTES NFR BLD AUTO: 12.6 %
NEUTROPHILS # BLD AUTO: 1.33 X10*3/UL (ref 1.2–7.7)
NEUTROPHILS NFR BLD AUTO: 38.1 %
NITRITE UR QL STRIP.AUTO: NEGATIVE
NRBC BLD-RTO: 0 /100 WBCS (ref 0–0)
PH UR STRIP.AUTO: 6.5 [PH]
PLATELET # BLD AUTO: 198 X10*3/UL (ref 150–450)
PROT UR STRIP.AUTO-MCNC: NEGATIVE MG/DL
PSA SERPL-MCNC: 0.73 NG/ML
RBC # BLD AUTO: 3.87 X10*6/UL (ref 4.5–5.9)
RBC # UR STRIP.AUTO: NEGATIVE MG/DL
SP GR UR STRIP.AUTO: 1
TESTOST SERPL-MCNC: 555 NG/DL (ref 240–1000)
TIBC SERPL-MCNC: 390 UG/DL (ref 240–445)
TSH SERPL-ACNC: 1.34 MIU/L (ref 0.44–3.98)
UIBC SERPL-MCNC: 276 UG/DL (ref 110–370)
UROBILINOGEN UR STRIP.AUTO-MCNC: NORMAL MG/DL
WBC # BLD AUTO: 3.5 X10*3/UL (ref 4.4–11.3)

## 2025-06-19 PROCEDURE — 83540 ASSAY OF IRON: CPT

## 2025-06-19 PROCEDURE — 85025 COMPLETE CBC W/AUTO DIFF WBC: CPT

## 2025-06-19 PROCEDURE — 84153 ASSAY OF PSA TOTAL: CPT

## 2025-06-19 PROCEDURE — 84403 ASSAY OF TOTAL TESTOSTERONE: CPT

## 2025-06-19 PROCEDURE — 84443 ASSAY THYROID STIM HORMONE: CPT

## 2025-06-19 PROCEDURE — 81003 URINALYSIS AUTO W/O SCOPE: CPT

## 2025-06-19 PROCEDURE — 36415 COLL VENOUS BLD VENIPUNCTURE: CPT

## 2025-06-20 LAB — HOLD SPECIMEN: 293

## 2025-07-17 ENCOUNTER — APPOINTMENT (OUTPATIENT)
Dept: RADIATION ONCOLOGY | Facility: HOSPITAL | Age: 55
End: 2025-07-17
Payer: COMMERCIAL

## (undated) DEVICE — BLANKET, LOWER BODY, VHA PLUS, ADULT

## (undated) DEVICE — APPLICATOR, CHLORAPREP, W/ORANGE TINT, 26ML

## (undated) DEVICE — Device

## (undated) DEVICE — SPONGE, GAUZE, AVANT, STERILE, NONWOVEN, 4PLY, 4 X 4, STANDARD

## (undated) DEVICE — GOWN, SURGICAL, SIRUS, NON REINFORCED, LARGE

## (undated) DEVICE — SYRINGE, 50 CC, IRRIGATION, CATHETER TIP, DISPOSABLE, STERILE, LF

## (undated) DEVICE — SYSTEM, SPACEOAR VUE, HYDROGEL

## (undated) DEVICE — MASK, AURASTRAIGHT, LARYN, SIZE 3

## (undated) DEVICE — DRESSING, NON-ADHERENT, CURAD, ABSORBENT, 3 X 8 IN, STERILE

## (undated) DEVICE — SYRINGE, 60 CC, IRRIGATION, TOOMEY TIP

## (undated) DEVICE — MASK, AURAGAIN, LARYN, SIZE 4.0

## (undated) DEVICE — SOLUTION, IRRIGATION, X RX SODIUM CHL 0.9%, 1000ML BTL

## (undated) DEVICE — GLOVE, SURGICAL, PROTEXIS PI , 7.5, PF, LF

## (undated) DEVICE — MASK, AURASTRAIGHT, LARYN, SIZE 5